# Patient Record
Sex: MALE | Race: WHITE | NOT HISPANIC OR LATINO | Employment: FULL TIME | URBAN - METROPOLITAN AREA
[De-identification: names, ages, dates, MRNs, and addresses within clinical notes are randomized per-mention and may not be internally consistent; named-entity substitution may affect disease eponyms.]

---

## 2017-02-12 ENCOUNTER — APPOINTMENT (OUTPATIENT)
Dept: GENERAL RADIOLOGY | Facility: HOSPITAL | Age: 47
End: 2017-02-12

## 2017-02-12 ENCOUNTER — APPOINTMENT (OUTPATIENT)
Dept: CARDIOLOGY | Facility: HOSPITAL | Age: 47
End: 2017-02-12
Attending: EMERGENCY MEDICINE

## 2017-02-12 ENCOUNTER — HOSPITAL ENCOUNTER (EMERGENCY)
Facility: HOSPITAL | Age: 47
Discharge: HOME | End: 2017-02-12
Attending: EMERGENCY MEDICINE | Admitting: EMERGENCY MEDICINE

## 2017-02-12 VITALS
DIASTOLIC BLOOD PRESSURE: 88 MMHG | WEIGHT: 230 LBS | SYSTOLIC BLOOD PRESSURE: 123 MMHG | HEART RATE: 68 BPM | OXYGEN SATURATION: 99 % | TEMPERATURE: 97.6 F | HEIGHT: 74 IN | BODY MASS INDEX: 29.52 KG/M2 | RESPIRATION RATE: 16 BRPM

## 2017-02-12 DIAGNOSIS — R00.2 HEART PALPITATIONS: Primary | ICD-10-CM

## 2017-02-12 DIAGNOSIS — G90.A POTS (POSTURAL ORTHOSTATIC TACHYCARDIA SYNDROME): ICD-10-CM

## 2017-02-12 DIAGNOSIS — R42 LIGHT HEADED: ICD-10-CM

## 2017-02-12 LAB
ALBUMIN SERPL-MCNC: 4.1 G/DL (ref 3.5–5.2)
ALBUMIN/GLOB SERPL: 1.5 G/DL
ALP SERPL-CCNC: 55 U/L (ref 39–117)
ALT SERPL W P-5'-P-CCNC: 23 U/L (ref 1–41)
ANION GAP SERPL CALCULATED.3IONS-SCNC: 15 MMOL/L
AST SERPL-CCNC: 22 U/L (ref 1–40)
BASOPHILS # BLD AUTO: 0.01 10*3/MM3 (ref 0–0.2)
BASOPHILS NFR BLD AUTO: 0.1 % (ref 0–1.5)
BILIRUB SERPL-MCNC: 0.3 MG/DL (ref 0.1–1.2)
BUN BLD-MCNC: 19 MG/DL (ref 6–20)
BUN/CREAT SERPL: 16.2 (ref 7–25)
CALCIUM SPEC-SCNC: 10.2 MG/DL (ref 8.6–10.5)
CHLORIDE SERPL-SCNC: 103 MMOL/L (ref 98–107)
CO2 SERPL-SCNC: 23 MMOL/L (ref 22–29)
CREAT BLD-MCNC: 1.17 MG/DL (ref 0.76–1.27)
DEPRECATED RDW RBC AUTO: 43.2 FL (ref 37–54)
EOSINOPHIL # BLD AUTO: 0.17 10*3/MM3 (ref 0–0.7)
EOSINOPHIL NFR BLD AUTO: 2 % (ref 0.3–6.2)
ERYTHROCYTE [DISTWIDTH] IN BLOOD BY AUTOMATED COUNT: 13.3 % (ref 11.5–14.5)
GFR SERPL CREATININE-BSD FRML MDRD: 67 ML/MIN/1.73
GLOBULIN UR ELPH-MCNC: 2.8 GM/DL
GLUCOSE BLD-MCNC: 111 MG/DL (ref 65–99)
HCT VFR BLD AUTO: 46.7 % (ref 40.4–52.2)
HGB BLD-MCNC: 15.5 G/DL (ref 13.7–17.6)
IMM GRANULOCYTES # BLD: 0 10*3/MM3 (ref 0–0.03)
IMM GRANULOCYTES NFR BLD: 0 % (ref 0–0.5)
LYMPHOCYTES # BLD AUTO: 2.2 10*3/MM3 (ref 0.9–4.8)
LYMPHOCYTES NFR BLD AUTO: 26.3 % (ref 19.6–45.3)
MAGNESIUM SERPL-MCNC: 2.2 MG/DL (ref 1.6–2.6)
MCH RBC QN AUTO: 29.5 PG (ref 27–32.7)
MCHC RBC AUTO-ENTMCNC: 33.2 G/DL (ref 32.6–36.4)
MCV RBC AUTO: 89 FL (ref 79.8–96.2)
MONOCYTES # BLD AUTO: 0.7 10*3/MM3 (ref 0.2–1.2)
MONOCYTES NFR BLD AUTO: 8.4 % (ref 5–12)
NEUTROPHILS # BLD AUTO: 5.28 10*3/MM3 (ref 1.9–8.1)
NEUTROPHILS NFR BLD AUTO: 63.2 % (ref 42.7–76)
PLATELET # BLD AUTO: 250 10*3/MM3 (ref 140–500)
PMV BLD AUTO: 10.8 FL (ref 6–12)
POTASSIUM BLD-SCNC: 4.1 MMOL/L (ref 3.5–5.2)
PROT SERPL-MCNC: 6.9 G/DL (ref 6–8.5)
RBC # BLD AUTO: 5.25 10*6/MM3 (ref 4.6–6)
SODIUM BLD-SCNC: 141 MMOL/L (ref 136–145)
T4 FREE SERPL-MCNC: 1.06 NG/DL (ref 0.93–1.7)
TROPONIN T SERPL-MCNC: <0.01 NG/ML (ref 0–0.03)
TSH SERPL DL<=0.05 MIU/L-ACNC: 1.37 MIU/ML (ref 0.27–4.2)
WBC NRBC COR # BLD: 8.36 10*3/MM3 (ref 4.5–10.7)

## 2017-02-12 PROCEDURE — 84439 ASSAY OF FREE THYROXINE: CPT | Performed by: EMERGENCY MEDICINE

## 2017-02-12 PROCEDURE — 93225 XTRNL ECG REC<48 HRS REC: CPT

## 2017-02-12 PROCEDURE — 96360 HYDRATION IV INFUSION INIT: CPT

## 2017-02-12 PROCEDURE — 83735 ASSAY OF MAGNESIUM: CPT | Performed by: EMERGENCY MEDICINE

## 2017-02-12 PROCEDURE — 84484 ASSAY OF TROPONIN QUANT: CPT | Performed by: EMERGENCY MEDICINE

## 2017-02-12 PROCEDURE — 99284 EMERGENCY DEPT VISIT MOD MDM: CPT

## 2017-02-12 PROCEDURE — 85025 COMPLETE CBC W/AUTO DIFF WBC: CPT | Performed by: EMERGENCY MEDICINE

## 2017-02-12 PROCEDURE — 84443 ASSAY THYROID STIM HORMONE: CPT | Performed by: EMERGENCY MEDICINE

## 2017-02-12 PROCEDURE — 93010 ELECTROCARDIOGRAM REPORT: CPT | Performed by: INTERNAL MEDICINE

## 2017-02-12 PROCEDURE — 93226 XTRNL ECG REC<48 HR SCAN A/R: CPT

## 2017-02-12 PROCEDURE — 71020 HC CHEST PA AND LATERAL: CPT

## 2017-02-12 PROCEDURE — 80053 COMPREHEN METABOLIC PANEL: CPT | Performed by: EMERGENCY MEDICINE

## 2017-02-12 PROCEDURE — 93005 ELECTROCARDIOGRAM TRACING: CPT

## 2017-02-12 RX ORDER — SODIUM CHLORIDE 0.9 % (FLUSH) 0.9 %
10 SYRINGE (ML) INJECTION AS NEEDED
Status: DISCONTINUED | OUTPATIENT
Start: 2017-02-12 | End: 2017-02-12 | Stop reason: HOSPADM

## 2017-02-12 RX ADMIN — SODIUM CHLORIDE 1000 ML: 9 INJECTION, SOLUTION INTRAVENOUS at 12:06

## 2017-02-12 NOTE — ED PROVIDER NOTES
EMERGENCY DEPARTMENT ENCOUNTER    CHIEF COMPLAINT  Chief Complaint: palpitations and chest pain  History given by: patient   History limited by: nothing   Room Number: 28/28  PMD: Jorge Blandon MD  Cardiologist: Dr. Melgar     HPI:  Pt is a 46 y.o. male who presents complaining of intermittent palpitations and chest pain for 2 days. Pt also complains of lightheadedness during palpitations, but he denies syncope and any other sx at this time. Nothing seems to exacerbate or alleviate the palpitations and chest pain. Pt consumes a small amount of caffeine each day and does not use recreational drugs. Pt has a personal h/o MVP and a strong family h/o early onset heart disease. Pt has worn a Holter monitor in the past, but nothing was found at that time.     Duration:  2 days, episodes vary in length  Timing: intermittent   Location: chest  Radiation: does not radiate   Quality: new  Intensity/Severity: moderate   Progression: improved at this time   Associated Symptoms: palpitations, chest pain, lightheadedness   Aggravating Factors: none specified   Alleviating Factors: none specified   Previous Episodes: none specified   Treatment before arrival: none specified     PAST MEDICAL HISTORY  Active Ambulatory Problems     Diagnosis Date Noted   • No Active Ambulatory Problems     Resolved Ambulatory Problems     Diagnosis Date Noted   • No Resolved Ambulatory Problems     Past Medical History   Diagnosis Date   • MVP (mitral valve prolapse)        PAST SURGICAL HISTORY  Past Surgical History   Procedure Laterality Date   • Adenoidectomy     • Tonsillectomy         FAMILY HISTORY  History reviewed. No pertinent family history.    SOCIAL HISTORY  Social History     Social History   • Marital status:      Spouse name: N/A   • Number of children: N/A   • Years of education: N/A     Occupational History   • Not on file.     Social History Main Topics   • Smoking status: Never Smoker   • Smokeless tobacco: Not on  file   • Alcohol use No   • Drug use: No   • Sexual activity: Not on file     Other Topics Concern   • Not on file     Social History Narrative   • No narrative on file       ALLERGIES  Review of patient's allergies indicates no known allergies.    REVIEW OF SYSTEMS  Review of Systems   Constitutional: Negative for activity change, appetite change and fever.   HENT: Negative for congestion and sore throat.    Eyes: Negative.    Respiratory: Negative for cough and shortness of breath.    Cardiovascular: Positive for chest pain and palpitations. Negative for leg swelling.   Gastrointestinal: Negative for abdominal pain, diarrhea and vomiting.   Endocrine: Negative.    Genitourinary: Negative for decreased urine volume and dysuria.   Musculoskeletal: Negative for neck pain.   Skin: Negative for rash and wound.   Allergic/Immunologic: Negative.    Neurological: Positive for light-headedness. Negative for weakness, numbness and headaches.   Hematological: Negative.    Psychiatric/Behavioral: Negative.    All other systems reviewed and are negative.      PHYSICAL EXAM  ED Triage Vitals   Temp Heart Rate Resp BP SpO2   02/12/17 1042 02/12/17 1042 02/12/17 1042 -- 02/12/17 1042   97.1 °F (36.2 °C) 169 20  99 %      Temp src Heart Rate Source Patient Position BP Location FiO2 (%)   02/12/17 1042 02/12/17 1042 -- -- --   Tympanic Monitor          Physical Exam   Constitutional: He is oriented to person, place, and time and well-developed, well-nourished, and in no distress.   HENT:   Head: Normocephalic and atraumatic.   Eyes: EOM are normal. Pupils are equal, round, and reactive to light.   Neck: Normal range of motion. Neck supple.   Cardiovascular: Normal rate, regular rhythm and normal heart sounds.    Pulmonary/Chest: Effort normal and breath sounds normal. No respiratory distress.   Abdominal: Soft. There is no tenderness. There is no rebound and no guarding.   Musculoskeletal: Normal range of motion. He exhibits no  edema.   Neurological: He is alert and oriented to person, place, and time. He has normal sensation and normal strength.   Skin: Skin is warm and dry.   Psychiatric: Mood and affect normal.   Nursing note and vitals reviewed.      LAB RESULTS  Lab Results (last 24 hours)     Procedure Component Value Units Date/Time    CBC & Differential [[id number]] Collected:  02/12/17 1104    Specimen:  Blood Updated:  02/12/17 1118    Narrative:       The following orders were created for panel order CBC & Differential.  Procedure                               Abnormality         Status                     ---------                               -----------         ------                     CBC Auto Differential[[id number]]         Normal              Final result                 Please view results for these tests on the individual orders.    Comprehensive Metabolic Panel [[id number]]  (Abnormal) Collected:  02/12/17 1104    Specimen:  Blood Updated:  02/12/17 1147     Glucose 111 (H) mg/dL      BUN 19 mg/dL      Creatinine 1.17 mg/dL      Sodium 141 mmol/L      Potassium 4.1 mmol/L       Specimen hemolyzed.  Results may be affected. Release stat results on hemolyzed specimen per Dr. Cuellar.        Chloride 103 mmol/L      CO2 23.0 mmol/L      Calcium 10.2 mg/dL      Total Protein 6.9 g/dL      Albumin 4.10 g/dL      ALT (SGPT) 23 U/L       Specimen hemolyzed.  Results may be affected.        AST (SGOT) 22 U/L       Specimen hemolyzed.  Results may be affected.        Alkaline Phosphatase 55 U/L      Total Bilirubin 0.3 mg/dL      eGFR Non African Amer 67 mL/min/1.73      Globulin 2.8 gm/dL      A/G Ratio 1.5 g/dL      BUN/Creatinine Ratio 16.2      Anion Gap 15.0 mmol/L     Troponin [[id number]]  (Normal) Collected:  02/12/17 1104    Specimen:  Blood Updated:  02/12/17 1155     Troponin T <0.010 ng/mL       Specimen hemolyzed, results may be affected.        Narrative:       Troponin T Reference Ranges:  Less than 0.03 ng/mL:     Negative for AMI  0.03 to 0.09 ng/mL:      Indeterminant for AMI  Greater than 0.09 ng/mL: Positive for AMI    T4, Free [[id number]]  (Normal) Collected:  02/12/17 1104    Specimen:  Blood Updated:  02/12/17 1142     Free T4 1.06 ng/dL     TSH [[id number]]  (Normal) Collected:  02/12/17 1104    Specimen:  Blood Updated:  02/12/17 1142     TSH 1.370 mIU/mL     Magnesium [[id number]]  (Normal) Collected:  02/12/17 1104    Specimen:  Blood Updated:  02/12/17 1136     Magnesium 2.2 mg/dL     CBC Auto Differential [[id number]]  (Normal) Collected:  02/12/17 1104    Specimen:  Blood Updated:  02/12/17 1118     WBC 8.36 10*3/mm3      RBC 5.25 10*6/mm3      Hemoglobin 15.5 g/dL      Hematocrit 46.7 %      MCV 89.0 fL      MCH 29.5 pg      MCHC 33.2 g/dL      RDW 13.3 %      RDW-SD 43.2 fl      MPV 10.8 fL      Platelets 250 10*3/mm3      Neutrophil % 63.2 %      Lymphocyte % 26.3 %      Monocyte % 8.4 %      Eosinophil % 2.0 %      Basophil % 0.1 %      Immature Grans % 0.0 %      Neutrophils, Absolute 5.28 10*3/mm3      Lymphocytes, Absolute 2.20 10*3/mm3      Monocytes, Absolute 0.70 10*3/mm3      Eosinophils, Absolute 0.17 10*3/mm3      Basophils, Absolute 0.01 10*3/mm3      Immature Grans, Absolute 0.00 10*3/mm3           I ordered the above labs and reviewed the results    RADIOLOGY  XR Chest 2 View   Final Result      CXR is negative.     I ordered the above noted radiological studies. Interpreted by radiologist. Reviewed by me in PACS.       PROCEDURES  Procedures  EKG           EKG time: 1045  Rhythm/Rate: Sinus tachycardia at 125  P waves and DC: normal  QRS, axis: poor R wave progression anteriorly    ST and T waves: nonspecific ST changes      Interpreted Contemporaneously by me, independently viewed  Changed compared to prior 10/01/14      PROGRESS AND CONSULTS  ED Course     1043: Ordered EKG for further evaluation.     1101: Ordered CXR and labs for further evaluation.     1153: Orthostatics reveal that pt's HR  increases upon standing, so I will order fluids.     1157: Rechecked pt. Pt is resting comfortably. Discussed unremarkable workup and plan to consult cardiology.   BP: 131/93 HR: 111 Temp: 97.1 °F (36.2 °C) (Tympanic) O2 sat: 96%    1217: Discussed pt's case with Dr. Linder (cardiology), who recommends either placing a ZIO patch or 48 hour Holter; however, there has been some difficulty getting ZIO patches approved by insurance companies recently. Pt will be seen in f/u.     1304: Rechecked pt. Pt is resting comfortably. Discussed plan for discharge with Holter monitor and cardiac f/u to rule out POTS. I provided pt with POTS reading material. Pt understands and agrees with plan and all questions were addressed.   BP: 121/88 HR: 67 Temp: 97.1 °F (36.2 °C) (Tympanic) O2 sat: 96%    MEDICAL DECISION MAKING  Results were reviewed/discussed with the patient and they were also made aware of online access. Pt also made aware that some labs, such as cultures, will not be resulted during ER visit and follow up with PMD is necessary.     MDM  Number of Diagnoses or Management Options     Amount and/or Complexity of Data Reviewed  Decide to obtain previous medical records or to obtain history from someone other than the patient: yes  Review and summarize past medical records: yes (Pt had an echo done in October 2014 that showed EF of 68%, trace mitral regurg, trace pv regurg, and otherwise nl. )         DIAGNOSIS  Final diagnoses:   Heart palpitations   Light headed   Rule Out POTS (postural orthostatic tachycardia syndrome)-       DISPOSITION  DISCHARGE    Patient discharged in stable condition.    Reviewed implications of results, diagnosis, meds, responsibility to follow up, warning signs and symptoms of possible worsening, potential complications and reasons to return to ER.    Patient/Family voiced understanding of above instructions.    Discussed plan for discharge, as there is no emergent indication for admission.   Pt/family is agreeable and understands need for follow up and repeat testing.  Pt is aware that discharge does not mean that nothing is wrong but it indicates no emergency is present that requires admission and they must continue care with follow-up as given below or physician of their choice.     FOLLOW-UP  Aaron Melgar MD  [address]  [place] [id number] [phone]      for continued evaluation of symptoms and Holter results         Medication List      Notice     No changes were made to your prescriptions during this visit.        Latest Documented Vital Signs:  As of 3:22 PM  BP- 123/88 HR- 68 Temp- 97.6 °F (36.4 °C) O2 sat- 99%    --  Documentation assistance provided by bud Tatum for Jeet Cuellar.  Information recorded by the scribe was done at my direction and has been verified and validated by me.               Rosina Tatum  02/12/17 0133       Jeet Cuellar MD  02/12/17 0734

## 2017-02-15 ENCOUNTER — TELEPHONE (OUTPATIENT)
Dept: SOCIAL WORK | Facility: HOSPITAL | Age: 47
End: 2017-02-15

## 2017-02-16 PROCEDURE — 93227 XTRNL ECG REC<48 HR R&I: CPT | Performed by: INTERNAL MEDICINE

## 2017-03-13 ENCOUNTER — OFFICE VISIT (OUTPATIENT)
Dept: CARDIOLOGY | Facility: CLINIC | Age: 47
End: 2017-03-13

## 2017-03-13 VITALS
DIASTOLIC BLOOD PRESSURE: 100 MMHG | BODY MASS INDEX: 30.29 KG/M2 | WEIGHT: 236 LBS | HEART RATE: 58 BPM | HEIGHT: 74 IN | SYSTOLIC BLOOD PRESSURE: 140 MMHG

## 2017-03-13 DIAGNOSIS — I10 ESSENTIAL HYPERTENSION: ICD-10-CM

## 2017-03-13 DIAGNOSIS — R00.2 PALPITATIONS: Primary | ICD-10-CM

## 2017-03-13 DIAGNOSIS — R06.02 SHORTNESS OF BREATH: ICD-10-CM

## 2017-03-13 PROCEDURE — 99204 OFFICE O/P NEW MOD 45 MIN: CPT | Performed by: INTERNAL MEDICINE

## 2017-03-13 PROCEDURE — 93000 ELECTROCARDIOGRAM COMPLETE: CPT | Performed by: INTERNAL MEDICINE

## 2017-03-13 NOTE — PROGRESS NOTES
Date of Office Visit: 17  Encounter Provider: Aaron Melgar MD  Place of Service: [facility] CARDIOLOGY  Patient Name: Adan Chauhan  :1970      Chief Complaint   Patient presents with   • Rapid Heart Rate     History of Present Illness  HPI Comments: Mr Chauhan is a pleasant 45 yo gentleman has a history of oral and hypertension, questionable mitral valve prolapse, and history of palpitations.  We saw him back in  with atypical symptoms at that time apparently with sinus mitral prolapse however follow-up echocardiogram in  did not confirm that.  But he now comes in for evaluation of palpitations.  As well as some shortness of breath.  He states that  he was having some rapid heart beat palpitations that was also then occurred on Saturday and then suddenly got worse and over to brush his 4-year-olds teeth before they went to [place] that he became dizzy lightheaded when to the emergency room.  There he ruled out.  He had a 48 day event monitor that was negative.  He probably has had these in the past but they've gotten more frequent and this episode was more severe.  He does have intermittent hypertension they say that it's related to stress.  He denies any near-syncope or syncope.  He does get shortness of breath especially if he mows the yard.  Denies orthopnea or PND.  Denies any lower extremity edema.  He does not do any regular exercise.  Denies any history of rheumatic fever, heart attack, heart failure.  About 2 years ago he had an episode where he had the visual disturbance in his right eye thought the modified without a stroke but that workup was negative.  He's also noted that when he gets these episodes of rapid heart beating that he coughs which does tend to help it's kind of a reflex though she also is noted that squatting down may help.        Past Medical History   Diagnosis Date   • Chest pain    • Emphysema lung       mild   • Lightheadedness    • MVP (mitral valve prolapse)    • Palpitations    • Sinus tachycardia    • Visual disturbances          Past Surgical History   Procedure Laterality Date   • Adenoidectomy     • Tonsillectomy     • Hemorroidectomy     • Carpal tunnel release           Current Outpatient Prescriptions on File Prior to Visit   Medication Sig Dispense Refill   • [DISCONTINUED] aspirin 81 MG tablet Take 81 mg by mouth.     • [DISCONTINUED] ibuprofen (ADVIL,MOTRIN) 200 MG tablet Take 200 mg by mouth Every 6 (Six) Hours.     • [DISCONTINUED] MULTIPLE VITAMINS-MINERALS ER PO Take 1 tablet by mouth.     • [DISCONTINUED] naproxen (NAPROSYN) 500 MG tablet Take 500 mg by mouth.       No current facility-administered medications on file prior to visit.          Social History     Social History   • Marital status:      Spouse name: N/A   • Number of children: N/A   • Years of education: N/A     Occupational History   • Not on file.     Social History Main Topics   • Smoking status: Former Smoker     Types: Cigarettes     Quit date: 2017   • Smokeless tobacco: Not on file   • Alcohol use No   • Drug use: No   • Sexual activity: Not on file     Other Topics Concern   • Not on file     Social History Narrative       Family History   Problem Relation Age of Onset   • Stroke Father          Review of Systems   Constitution: Negative for decreased appetite, diaphoresis, fever, weakness, malaise/fatigue, weight gain and weight loss.   HENT: Negative for congestion, headaches, hearing loss, nosebleeds and tinnitus.    Eyes: Negative for blurred vision, double vision, vision loss in left eye, vision loss in right eye and visual disturbance.   Cardiovascular:        As noted in HPI   Respiratory:        As noted HPI   Endocrine: Negative for cold intolerance and heat intolerance.   Hematologic/Lymphatic: Negative for bleeding problem. Does not bruise/bleed easily.   Skin: Negative for color change, flushing, itching  "and rash.   Musculoskeletal: Negative for arthritis, back pain, joint pain, joint swelling, muscle weakness and myalgias.   Gastrointestinal: Negative for bloating, abdominal pain, constipation, diarrhea, dysphagia, heartburn, hematemesis, hematochezia, melena, nausea and vomiting.   Genitourinary: Negative for bladder incontinence, dysuria, frequency, nocturia and urgency.   Neurological: Negative for dizziness, focal weakness, light-headedness, loss of balance, numbness, paresthesias and vertigo.   Psychiatric/Behavioral: Negative for depression, memory loss and substance abuse.       Procedures      ECG 12 Lead  Date/Time: 3/13/2017 10:15 AM  Performed by: NATHALIE GARCIA  Authorized by: NATHALIE GARCIA   Comparison: compared with previous ECG   Similar to previous ECG  Rhythm: sinus rhythm  Rate: normal  QRS axis: normal                 Objective:      Visit Vitals   • /100 (BP Location: Left arm)   • Pulse 58   • Ht 74\" (188 cm)   • Wt 236 lb (107 kg)   • BMI 30.3 kg/m2          Physical Exam  Physical Exam   Constitutional: He is oriented to person, place, and time. He appears well-developed and well-nourished. No distress.   HENT:   Head: Normocephalic.   Eyes: Conjunctivae are normal. Pupils are equal, round, and reactive to light. No scleral icterus.   Neck: Normal carotid pulses, no hepatojugular reflux and no JVD present. Carotid bruit is not present. No tracheal deviation, no edema and no erythema present. No thyromegaly present.   Cardiovascular: Normal rate, regular rhythm, S1 normal, S2 normal, normal heart sounds and intact distal pulses.   No extrasystoles are present. PMI is not displaced.  Exam reveals no gallop, no distant heart sounds and no friction rub.    No murmur heard.  Pulses:       Carotid pulses are 2+ on the right side, and 2+ on the left side.       Radial pulses are 2+ on the right side, and 2+ on the left side.        Femoral pulses are 2+ on the right side, and 2+ on the " left side.       Dorsalis pedis pulses are 2+ on the right side, and 2+ on the left side.        Posterior tibial pulses are 2+ on the right side, and 2+ on the left side.   Pulmonary/Chest: Effort normal and breath sounds normal. No respiratory distress. He has no decreased breath sounds. He has no wheezes. He has no rhonchi. He has no rales. He exhibits no tenderness.   Abdominal: Soft. Bowel sounds are normal. He exhibits no distension and no mass. There is no hepatosplenomegaly. There is no tenderness. There is no rebound and no guarding.   Musculoskeletal: He exhibits no edema, tenderness or deformity.   Neurological: He is alert and oriented to person, place, and time.   Skin: Skin is warm and dry. No rash noted. He is not diaphoretic. No cyanosis or erythema. No pallor. Nails show no clubbing.   Psychiatric: He has a normal mood and affect. His speech is normal and behavior is normal. Judgment and thought content normal.           Assessment:   1. 45 yo gentle man with palpitations that sound compatible with supraventricular tachycardia. He has them so infrequently I doubt we would pick them up.  However they could be related to his high blood pressure.  2.  Hypertension.  Concerned that it may be more continuous now.  They're to check his blood pressures twice a day at home and call us back in a week if elevated treating him with beta blocker or calcium channel blocker may treat both the supraventricular arrhythmia as well as his hypertension.  3.  Dyspnea with activity.  He's return for a stress test.  He could have exercise-induced arrhythmia.  Could have exercise-induced hypertension.  Also we'll rule out ischemic disease.  4.  Questionable mitral prolapse not confirmed on echocardiogram 2-1/2 years ago. And I do not hear a murmur today.         Plan:

## 2017-03-22 ENCOUNTER — TELEPHONE (OUTPATIENT)
Dept: CARDIOLOGY | Facility: CLINIC | Age: 47
End: 2017-03-22

## 2017-03-22 ENCOUNTER — HOSPITAL ENCOUNTER (OUTPATIENT)
Dept: CARDIOLOGY | Facility: HOSPITAL | Age: 47
Discharge: HOME | End: 2017-03-22
Attending: INTERNAL MEDICINE | Admitting: INTERNAL MEDICINE

## 2017-03-22 DIAGNOSIS — R00.2 PALPITATIONS: ICD-10-CM

## 2017-03-22 DIAGNOSIS — R06.02 SHORTNESS OF BREATH: ICD-10-CM

## 2017-03-22 LAB
BH CV STRESS BP STAGE 1: NORMAL
BH CV STRESS BP STAGE 2: NORMAL
BH CV STRESS BP STAGE 3: NORMAL
BH CV STRESS DURATION MIN STAGE 1: 3
BH CV STRESS DURATION MIN STAGE 2: 3
BH CV STRESS DURATION MIN STAGE 3: 3
BH CV STRESS DURATION MIN STAGE 4: 1
BH CV STRESS DURATION SEC STAGE 1: 0
BH CV STRESS DURATION SEC STAGE 2: 0
BH CV STRESS DURATION SEC STAGE 3: 0
BH CV STRESS DURATION SEC STAGE 4: 0
BH CV STRESS GRADE STAGE 1: 10
BH CV STRESS GRADE STAGE 2: 12
BH CV STRESS GRADE STAGE 3: 14
BH CV STRESS GRADE STAGE 4: 16
BH CV STRESS HR STAGE 1: 118
BH CV STRESS HR STAGE 2: 164
BH CV STRESS HR STAGE 3: 192
BH CV STRESS HR STAGE 4: 198
BH CV STRESS METS STAGE 1: 5
BH CV STRESS METS STAGE 2: 7.5
BH CV STRESS METS STAGE 3: 10
BH CV STRESS METS STAGE 4: 13.5
BH CV STRESS PROTOCOL 1: NORMAL
BH CV STRESS RECOVERY BP: NORMAL MMHG
BH CV STRESS RECOVERY HR: 124 BPM
BH CV STRESS SPEED STAGE 1: 1.7
BH CV STRESS SPEED STAGE 2: 2.5
BH CV STRESS SPEED STAGE 3: 3.4
BH CV STRESS SPEED STAGE 4: 4.2
BH CV STRESS STAGE 1: 1
BH CV STRESS STAGE 2: 2
BH CV STRESS STAGE 3: 3
BH CV STRESS STAGE 4: 4
MAXIMAL PREDICTED HEART RATE: 175 BPM
PERCENT MAX PREDICTED HR: 113.14 %
STRESS BASELINE BP: NORMAL MMHG
STRESS BASELINE HR: 77 BPM
STRESS PERCENT HR: 133 %
STRESS POST ESTIMATED WORKLOAD: 11 METS
STRESS POST EXERCISE DUR MIN: 10 MIN
STRESS POST EXERCISE DUR SEC: 0 SEC
STRESS POST PEAK BP: NORMAL MMHG
STRESS POST PEAK HR: 198 BPM
STRESS TARGET HR: 149 BPM

## 2017-03-22 PROCEDURE — 93017 CV STRESS TEST TRACING ONLY: CPT

## 2017-03-22 PROCEDURE — 93018 CV STRESS TEST I&R ONLY: CPT | Performed by: INTERNAL MEDICINE

## 2017-03-22 PROCEDURE — 93016 CV STRESS TEST SUPVJ ONLY: CPT | Performed by: INTERNAL MEDICINE

## 2017-03-22 NOTE — TELEPHONE ENCOUNTER
Adan Chauhan  Male, 46 y.o., 1970  PCP:   Jorge Blandon MD  Language:   English  Need Interp:   No  Last Weight:   236 lb (107 kg)  Phone:   W: [phone] H: [phone]  Allergies  No Known Allergies  Health Maintenance:   Due  FYI:   None  Primary Ins.:   [employer]  MRN:   [id number]  MyChart:   Pending  Pharmacy:   [id number] - [phone] [facility] [phone] FX [[id number]]  Preferred Lab:   None  Next Appt with Me:   None  Next Appt Date by Dept:   None    Treadmill Stress Test   Status:  Final result   Visible to patient:  No (Not Released)   Dx:  Palpitations; Shortness of breath Order: [id number]         Details        Reading Physician Reading Date Result Priority       MD Krzysztof Villa MD 3/22/2017  3/22/2017 Routine           Result Text             · No definative ECG evidence of myocardial ischemia  · Pt had 1mm up-sloping ST during exercise which resolved within 1 min into recovery.                    All Measurements          Ref Range & Units 9:26 AM         BH CV STRESS PROTOCOL 1  Hussein       Stage 1  1       HR Stage 1  118       BP Stage 1  152/96       Duration Min Stage 1  3       Duration Sec Stage 1  0       Grade Stage 1  10       Speed Stage 1  1.7       BH CV STRESS METS STAGE 1  5       Stage 2  2       HR Stage 2  164       BP Stage 2  158/100       Duration Min Stage 2  3       Duration Sec Stage 2  0       Grade Stage 2  12       Speed Stage 2  2.5       BH CV STRESS METS STAGE 2  7.5       Stage 3  3       HR Stage 3  192       BP Stage 3  164/100       Duration Min Stage 3  3       Duration Sec Stage 3  0       Grade Stage 3  14       Speed Stage 3  3.4       BH CV STRESS METS STAGE 3  10.0       Stage 4  4       HR Stage 4  198       Duration Min Stage 4  1       Duration Sec Stage 4  0       Grade Stage 4  16       Speed Stage 4  4.2       BH CV STRESS METS STAGE 4  13.5       Baseline HR bpm 77        Baseline BP mmHg 130/96       Peak HR bpm 198       Percent Max Pred HR % 113.14       Percent Target HR % 133       Peak BP mmHg 164/100       Recovery HR bpm 124       Recovery BP mmHg 158/92       Target HR (85%) bpm 149       Max. Pred. HR (100%) bpm 175       Exercise duration (min) min 10       Exercise duration (sec) sec 0       Estimated workload METS 11.0                       [facility]  [address]  [address]  [facility] [address] [phone]            Stress Data        Stage HR (bpm) BP (mmHg) Minutes Seconds Grade (%) Speed (MPH)       1        118        152/96        3        0        10        1.7              2        164        158/100        3        0        12        2.5              3        192        164/100        3        0        14        3.4              4        198            1        0        16        4.2                  Stress Measurements        Baseline Vitals   Baseline HR 77 bpm        Baseline /96 mmHg         Peak Stress Vitals   Peak  bpm        Peak /100 mmHg         Recovery Vitals   Recovery  bpm        Recovery /92 mmHg         Exercise Data   Target HR (85%) 149 bpm        Max. Pred. HR (100%) 175 bpm        Percent Max Pred .14 %        Exercise duration (min) 10 min        Exercise duration (sec) 0 sec        Estimated workload 11 METS                 Stress Procedure        Rest ECG  Baseline ECG of normal sinus rhythm noted. Normal baseline ECG noted. Slight ST elevation.   UT interval = 160 ms. QRS complex = 80 ms. There was no ST segment deviation noted.       Stress ECG  Stress ECG of normal sinus rhythm noted. Normal ECG with no significant stress induced changes noted.   There was no ST segment deviation noted during stress.   Arrhythmias during stress: occasional PVCs.   There were no arrhythmias during recovery.   Arrhythmias were not significant.   ECG was interpretable and indicates  a normal stress ECG.   Normal ECG stress ECG interpretation.       Stress Description  A stress test was performed following the Hussein protocol.   The patient achieved the target heart rate.   The patient reported no symptoms during the stress test. The patient experienced no angina during the stress test.   The Duke Treadmill Score of 10 is consistent with a low risk for ischemic heart disease.   Blood pressure demonstrated a hypertensive response to exercise. Heart rate demonstrated a normal response to exercise. Overall, the patient's exercise capacity was normal.       Stress Findings  No ECG evidence of myocardial ischemia.Negative clinical evidence of myocardial ischemia. Findings consistent with a normal ECG stress test. Pt had 1mm up-sloping ST during exercise which resolved within 1 min into recovery..                   Last Resulted: 03/22/17  1:37 PM                            Routing History        Priority Sent On From To Message Type        3/22/2017  1:39 PM MD Jorge Villa MD Results        3/22/2017  1:39 PM MD Aaron Villa MD Results            Reviewed By Anjali Ruelas MD on 3/22/2017  2:21 PM

## 2017-03-24 RX ORDER — AMLODIPINE BESYLATE 2.5 MG/1
2.5 TABLET ORAL DAILY
Qty: 30 TABLET | Refills: 11 | Status: SHIPPED | OUTPATIENT
Start: 2017-03-24 | End: 2018-03-13 | Stop reason: SDUPTHER

## 2017-09-06 ENCOUNTER — TELEPHONE (OUTPATIENT)
Dept: CARDIOLOGY | Facility: CLINIC | Age: 47
End: 2017-09-06

## 2017-09-06 NOTE — TELEPHONE ENCOUNTER
Pt's wife Lizzy called to report pt is experiencing diarrhea >5 bowel movements a day for the past several months. He was started on Norvasc 2.5mg daily on 3/24/17 ,this is listed as a possible side effect. Please advise [phone]

## 2018-03-13 RX ORDER — AMLODIPINE BESYLATE 2.5 MG/1
TABLET ORAL
Qty: 30 TABLET | Refills: 2 | Status: SHIPPED | OUTPATIENT
Start: 2018-03-13 | End: 2018-05-11 | Stop reason: SDUPTHER

## 2018-05-15 RX ORDER — AMLODIPINE BESYLATE 2.5 MG/1
TABLET ORAL
Qty: 30 TABLET | Refills: 1 | Status: SHIPPED | OUTPATIENT
Start: 2018-05-15 | End: 2018-06-22 | Stop reason: SDUPTHER

## 2018-06-22 ENCOUNTER — TELEPHONE (OUTPATIENT)
Dept: CARDIOLOGY | Facility: CLINIC | Age: 48
End: 2018-06-22

## 2018-06-22 RX ORDER — AMLODIPINE BESYLATE 2.5 MG/1
2.5 TABLET ORAL DAILY
Qty: 30 TABLET | Refills: 1 | Status: SHIPPED | OUTPATIENT
Start: 2018-06-22 | End: 2018-08-13 | Stop reason: SDUPTHER

## 2018-06-22 NOTE — TELEPHONE ENCOUNTER
Lucy, patient called to ask for a refill on Norvasc.  There is documentation on 9/6/17 that it caused patient to have diarrhea and Dr. Melgar advised to stop taking it.  Mirna Phillip refilled this on 5/11/18.  I do not see where Dr. Melgar instructed to start back on this.  Can you please review chart to see if I have missed something.  Abelardo you/ OTONIEL     Patient can be reached at [phone] or [phone].

## 2018-06-22 NOTE — TELEPHONE ENCOUNTER
I s/w his wife Lizzy, who is an RN. The patient stopped drinking wine and his diarrhea resolved. His b/p remained high, and he resumed his Norvasc. I've scheduled him a yearly f/u in August @ [facility]   36.1

## 2018-08-13 ENCOUNTER — OFFICE VISIT (OUTPATIENT)
Dept: CARDIOLOGY | Facility: CLINIC | Age: 48
End: 2018-08-13

## 2018-08-13 ENCOUNTER — TELEPHONE (OUTPATIENT)
Dept: CARDIOLOGY | Facility: CLINIC | Age: 48
End: 2018-08-13

## 2018-08-13 ENCOUNTER — APPOINTMENT (OUTPATIENT)
Dept: LAB | Facility: HOSPITAL | Age: 48
End: 2018-08-13

## 2018-08-13 VITALS
HEIGHT: 74 IN | HEART RATE: 61 BPM | DIASTOLIC BLOOD PRESSURE: 90 MMHG | SYSTOLIC BLOOD PRESSURE: 140 MMHG | WEIGHT: 241 LBS | BODY MASS INDEX: 30.93 KG/M2

## 2018-08-13 DIAGNOSIS — E78.2 MIXED HYPERLIPIDEMIA: ICD-10-CM

## 2018-08-13 DIAGNOSIS — I10 ESSENTIAL HYPERTENSION: Primary | ICD-10-CM

## 2018-08-13 LAB
ALBUMIN SERPL-MCNC: 4.3 G/DL (ref 3.5–5.2)
ALBUMIN/GLOB SERPL: 1.5 G/DL
ALP SERPL-CCNC: 71 U/L (ref 39–117)
ALT SERPL W P-5'-P-CCNC: 20 U/L (ref 1–41)
ANION GAP SERPL CALCULATED.3IONS-SCNC: 10.6 MMOL/L
AST SERPL-CCNC: 20 U/L (ref 1–40)
BILIRUB SERPL-MCNC: 0.3 MG/DL (ref 0.1–1.2)
BUN BLD-MCNC: 18 MG/DL (ref 6–20)
BUN/CREAT SERPL: 16.4 (ref 7–25)
CALCIUM SPEC-SCNC: 10.7 MG/DL (ref 8.6–10.5)
CHLORIDE SERPL-SCNC: 105 MMOL/L (ref 98–107)
CHOLEST SERPL-MCNC: 176 MG/DL (ref 0–200)
CO2 SERPL-SCNC: 24.4 MMOL/L (ref 22–29)
CREAT BLD-MCNC: 1.1 MG/DL (ref 0.76–1.27)
GFR SERPL CREATININE-BSD FRML MDRD: 72 ML/MIN/1.73
GLOBULIN UR ELPH-MCNC: 2.9 GM/DL
GLUCOSE BLD-MCNC: 95 MG/DL (ref 65–99)
HDLC SERPL-MCNC: 47 MG/DL (ref 40–60)
LDLC SERPL CALC-MCNC: 103 MG/DL (ref 0–100)
LDLC/HDLC SERPL: 2.18 {RATIO}
POTASSIUM BLD-SCNC: 4.1 MMOL/L (ref 3.5–5.2)
PROT SERPL-MCNC: 7.2 G/DL (ref 6–8.5)
SODIUM BLD-SCNC: 140 MMOL/L (ref 136–145)
TRIGL SERPL-MCNC: 132 MG/DL (ref 0–150)
VLDLC SERPL-MCNC: 26.4 MG/DL (ref 5–40)

## 2018-08-13 PROCEDURE — 36415 COLL VENOUS BLD VENIPUNCTURE: CPT | Performed by: INTERNAL MEDICINE

## 2018-08-13 PROCEDURE — 80053 COMPREHEN METABOLIC PANEL: CPT | Performed by: INTERNAL MEDICINE

## 2018-08-13 PROCEDURE — 93000 ELECTROCARDIOGRAM COMPLETE: CPT | Performed by: INTERNAL MEDICINE

## 2018-08-13 PROCEDURE — 99214 OFFICE O/P EST MOD 30 MIN: CPT | Performed by: INTERNAL MEDICINE

## 2018-08-13 PROCEDURE — 80061 LIPID PANEL: CPT | Performed by: INTERNAL MEDICINE

## 2018-08-13 RX ORDER — AMLODIPINE BESYLATE 5 MG/1
5 TABLET ORAL DAILY
Qty: 30 TABLET | Refills: 11 | Status: SHIPPED | OUTPATIENT
Start: 2018-08-13 | End: 2018-08-29 | Stop reason: DRUGHIGH

## 2018-08-13 RX ORDER — CETIRIZINE HYDROCHLORIDE 10 MG/1
10 TABLET ORAL DAILY
COMMUNITY
End: 2020-10-19 | Stop reason: SDUPTHER

## 2018-08-13 NOTE — PROGRESS NOTES
Date of Office Visit: 18  Encounter Provider: Aaron Melgar MD  Place of Service: [facility] CARDIOLOGY  Patient Name: Adan Chauhan  :1970  Referral Provider:Jorge Blandon MD      Chief Complaint   Patient presents with   • Hypertension     History of Present Illness  Mr Chauhan is a pleasant 46yo gentleman has a history of oral and hypertension, questionable mitral valve prolapse, and history of palpitations.  We saw him back in  with atypical symptoms at that time apparently with mitral prolapse however follow-up echocardiogram in  did not confirm that.  In 2017 he was having some palpitations but too infrequent to .  Also having some shortness of breath had a normal ECG stress test.  He now comes in for follow-up.  He is been exercising runs 15 minutes and then lifts weights 4 days week without problems.  He brought in a number blood pressure recordings unfortunately been a little high size 150 systolic and 95 diastolic.  But he denies chest pain or pressure.  Denies any real shortness of breath, orthopnea, or PND.  No significant palpitations, near-syncope or syncope.  Overall he feels like he's doing reasonably well.          Past Medical History:   Diagnosis Date   • Chest pain    • Emphysema lung (CMS/HCC)     mild   • Lightheadedness    • MVP (mitral valve prolapse)    • Palpitations    • Sinus tachycardia    • Visual disturbances          Past Surgical History:   Procedure Laterality Date   • ADENOIDECTOMY     • CARPAL TUNNEL RELEASE     • HEMORROIDECTOMY     • TONSILLECTOMY           Current Outpatient Prescriptions on File Prior to Visit   Medication Sig Dispense Refill   • [DISCONTINUED] amLODIPine (NORVASC) 2.5 MG tablet Take 1 tablet by mouth Daily. 30 tablet 1     No current facility-administered medications on file prior to visit.          Social History     Social History   • Marital status:      Spouse name: Lizzy   •  Number of children: 3   • Years of education: N/A     Occupational History   • Not on file.     Social History Main Topics   • Smoking status: Former Smoker     Types: Cigarettes     Quit date: 2017   • Smokeless tobacco: Never Used   • Alcohol use 4.2 oz/week     7 Glasses of wine per week      Comment: 1 g/o red wine daily w/dinner   • Drug use: No   • Sexual activity: Defer     Other Topics Concern   • Not on file     Social History Narrative   • No narrative on file       Family History   Problem Relation Age of Onset   • Stroke Father    • Stroke Paternal Uncle          Review of Systems   Constitution: Negative for decreased appetite, diaphoresis, fever, weakness, malaise/fatigue, weight gain and weight loss.   HENT: Negative for congestion, hearing loss, nosebleeds and tinnitus.    Eyes: Negative for blurred vision, double vision, vision loss in left eye, vision loss in right eye and visual disturbance.   Cardiovascular:        As noted in HPI   Respiratory:        As noted HPI   Endocrine: Negative for cold intolerance and heat intolerance.   Hematologic/Lymphatic: Negative for bleeding problem. Does not bruise/bleed easily.   Skin: Negative for color change, flushing, itching and rash.   Musculoskeletal: Negative for arthritis, back pain, joint pain, joint swelling, muscle weakness and myalgias.   Gastrointestinal: Negative for bloating, abdominal pain, constipation, diarrhea, dysphagia, heartburn, hematemesis, hematochezia, melena, nausea and vomiting.   Genitourinary: Negative for bladder incontinence, dysuria, frequency, nocturia and urgency.   Neurological: Negative for dizziness, focal weakness, headaches, light-headedness, loss of balance, numbness, paresthesias and vertigo.   Psychiatric/Behavioral: Negative for depression, memory loss and substance abuse.       Procedures      ECG 12 Lead  Date/Time: 8/13/2018 11:30 AM  Performed by: NATHALIE GARCIA  Authorized by: NATHALIE GARCIA   Comparison:  "compared with previous ECG   Similar to previous ECG  Rhythm: sinus rhythm  Rate: normal  QRS axis: normal                  Objective:    /90 (BP Location: Left arm)   Pulse 61   Ht 188 cm (74\")   Wt 109 kg (241 lb)   BMI 30.94 kg/m²        Physical Exam  Physical Exam   Constitutional: He is oriented to person, place, and time. He appears well-developed and well-nourished. No distress.   HENT:   Head: Normocephalic.   Eyes: Pupils are equal, round, and reactive to light. Conjunctivae are normal. No scleral icterus.   Neck: Normal carotid pulses, no hepatojugular reflux and no JVD present. Carotid bruit is not present. No tracheal deviation, no edema and no erythema present. No thyromegaly present.   Cardiovascular: Normal rate, regular rhythm, S1 normal, S2 normal, normal heart sounds and intact distal pulses.   No extrasystoles are present. PMI is not displaced.  Exam reveals no gallop, no distant heart sounds and no friction rub.    No murmur heard.  Pulses:       Carotid pulses are 2+ on the right side, and 2+ on the left side.       Radial pulses are 2+ on the right side, and 2+ on the left side.        Femoral pulses are 2+ on the right side, and 2+ on the left side.       Dorsalis pedis pulses are 2+ on the right side, and 2+ on the left side.        Posterior tibial pulses are 2+ on the right side, and 2+ on the left side.   Pulmonary/Chest: Effort normal and breath sounds normal. No respiratory distress. He has no decreased breath sounds. He has no wheezes. He has no rhonchi. He has no rales. He exhibits no tenderness.   Abdominal: Soft. Bowel sounds are normal. He exhibits no distension and no mass. There is no hepatosplenomegaly. There is no tenderness. There is no rebound and no guarding.   Musculoskeletal: He exhibits no edema, tenderness or deformity.   Neurological: He is alert and oriented to person, place, and time.   Skin: Skin is warm and dry. No rash noted. He is not diaphoretic. No " cyanosis or erythema. No pallor. Nails show no clubbing.   Psychiatric: He has a normal mood and affect. His speech is normal and behavior is normal. Judgment and thought content normal.           Assessment:   1. 45 yo gentleman with history of palpitations that sound compatible with supraventricular tachycardia. No real recurrence.  2.  Hypertension.  Blood pressure still little elevated quite at goal he's going to further try to decrease his sodium intake.  We are increasing the amlodipine to 5 mg a day he's to call us back in one to 2 weeks let us know what those readings are.  3.  Questionable mitral prolapse not confirmed on echocardiogram 2-1/2 years ago. And I do not hear a murmur today.   4.  Cardiovascular risk assessment.  She's not had lipids checked in a while her in to check lipid profile on him today to make recommendations pending         Plan:

## 2018-08-13 NOTE — TELEPHONE ENCOUNTER
Adan Chauhan  Male, 47 y.o., 1970  PCP:   Jorge Blandon MD  Language:   English  Need Interp:   No  Last Weight:   109 kg (241 lb)  Phone:   W: [phone] M: [phone] H: [phone]  Allergies  No Known Allergies  Health Maintenance:   Due  FYI:   None  Primary Ins.:   [employer]  MRN:   [id number]  MyChart:   Active  Pharmacy:   [id number] - [phone] [facility] [phone] FX [[id number]]  Preferred Lab:   None  Next Appt with Me:   08/19/2019  Next Appt Date by Dept:   08/19/2019        Lipid Panel   Order: [id number]   Status:  Final result   Visible to patient:  No (Not Released) Dx:  Mixed hyperlipidemia    Ref Range & Units 12:03   Total Cholesterol 0 - 200 mg/dL 176    Triglycerides 0 - 150 mg/dL 132    HDL Cholesterol 40 - 60 mg/dL 47    LDL Cholesterol  0 - 100 mg/dL 103     VLDL Cholesterol 5 - 40 mg/dL 26.4    LDL/HDL Ratio  2.18    Resulting Agency  [facility]   Narrative     Cholesterol Reference Ranges  (U.S. Department of Health and Human Services ATP III Classifications)    Desirable          <200 mg/dL  Borderline High    200-239 mg/dL  High Risk          >240 mg/dL      Triglyceride Reference Ranges  (U.S. Department of Health and Human Services ATP III Classifications)    Normal           <150 mg/dL  Borderline High  150-199 mg/dL  High             200-499 mg/dL  Very High        >500 mg/dL    HDL Reference Ranges  (U.S. Department of Health and Human Services ATP III Classifcations)    Low     <40 mg/dl (major risk factor for CHD)  High    >60 mg/dl ('negative' risk factor for CHD)        LDL Reference Ranges  (U.S. Department of Health and Human Services ATP III Classifcations)    Optimal          <100 mg/dL  Near Optimal     100-129 mg/dL  Borderline High  130-159 mg/dL  High             160-189 mg/dL  Very High        >189 mg/dL      Specimen Collected: 08/13/18 12:03 Last Resulted: 08/13/18 15:47                        Previously Reviewed Results   Status of Other Orders     Order  Lab Status Result Date Provider Status   ECG 12 Lead Final result 8/13/2018 Open          Encounter Notes      All notes         Routing History     Priority Sent On From To Message Type    8/13/2018  1:19 PM Lab, Background User Aaron Melgar MD Results

## 2018-08-24 ENCOUNTER — TELEPHONE (OUTPATIENT)
Dept: CARDIOLOGY | Facility: CLINIC | Age: 48
End: 2018-08-24

## 2018-08-29 ENCOUNTER — TELEPHONE (OUTPATIENT)
Dept: CARDIOLOGY | Facility: CLINIC | Age: 48
End: 2018-08-29

## 2018-08-29 RX ORDER — AMLODIPINE BESYLATE 10 MG/1
10 TABLET ORAL DAILY
Qty: 30 TABLET | Refills: 5 | Status: SHIPPED | OUTPATIENT
Start: 2018-08-29 | End: 2019-03-07 | Stop reason: SDUPTHER

## 2018-08-29 NOTE — TELEPHONE ENCOUNTER
Pt notified and voiced understanding.  [facility].  [facility]

## 2018-08-29 NOTE — TELEPHONE ENCOUNTER
[phone]    Pt called stating his amlodipine was increased to 5mg 2 weeks ago.  He states his BP is still 140s/90s.  He wonders if you would like to make any changes.      Spoke w pt and he denies any symptoms.     Please advise.  [facility]

## 2018-09-24 ENCOUNTER — OFFICE VISIT (OUTPATIENT)
Dept: INTERNAL MEDICINE | Age: 48
End: 2018-09-24

## 2018-09-24 VITALS
SYSTOLIC BLOOD PRESSURE: 116 MMHG | HEIGHT: 74 IN | WEIGHT: 236.6 LBS | BODY MASS INDEX: 30.36 KG/M2 | OXYGEN SATURATION: 95 % | TEMPERATURE: 97.7 F | DIASTOLIC BLOOD PRESSURE: 80 MMHG | HEART RATE: 65 BPM

## 2018-09-24 DIAGNOSIS — I34.1 BILLOWING MITRAL VALVE: ICD-10-CM

## 2018-09-24 DIAGNOSIS — E83.52 HYPERCALCEMIA: ICD-10-CM

## 2018-09-24 DIAGNOSIS — E66.3 OVERWEIGHT: ICD-10-CM

## 2018-09-24 DIAGNOSIS — Z00.00 ROUTINE HEALTH MAINTENANCE: Primary | ICD-10-CM

## 2018-09-24 DIAGNOSIS — R19.5 OCCULT BLOOD IN STOOLS: Primary | ICD-10-CM

## 2018-09-24 PROBLEM — K64.9 HEMORRHOID: Status: ACTIVE | Noted: 2018-09-24

## 2018-09-24 PROBLEM — H53.9 UNSPECIFIED VISUAL DISTURBANCE: Status: ACTIVE | Noted: 2018-09-24

## 2018-09-24 PROBLEM — H91.90 DECREASED HEARING: Status: ACTIVE | Noted: 2018-09-24

## 2018-09-24 LAB
DEVELOPER EXPIRATION DATE: ABNORMAL
DEVELOPER LOT NUMBER: ABNORMAL
EXPIRATION DATE: ABNORMAL
FECAL OCCULT BLOOD SCREEN, POC: POSITIVE
Lab: ABNORMAL
NEGATIVE CONTROL: NEGATIVE
POSITIVE CONTROL: POSITIVE

## 2018-09-24 PROCEDURE — 82270 OCCULT BLOOD FECES: CPT | Performed by: INTERNAL MEDICINE

## 2018-09-24 PROCEDURE — 99396 PREV VISIT EST AGE 40-64: CPT | Performed by: INTERNAL MEDICINE

## 2018-09-24 NOTE — PROGRESS NOTES
[facility]  MD Adan CRAIG Tien / 47 y.o. / male  09/24/2018      ASSESSMENT & PLAN:    Problem List Items Addressed This Visit        Unprioritized    Billowing mitral valve    Overview     Last echocardiogram 2014.         Relevant Medications    amLODIPine (NORVASC) 10 MG tablet    Other Relevant Orders    Adult Transthoracic Echo Complete W/ Cont if Necessary Per Protocol      Other Visit Diagnoses     Routine health maintenance    -  Primary    Relevant Orders    CBC & Differential    POC Occult Blood Stool    PSA DIAGNOSTIC    Urinalysis With Microscopic If Indicated (No Culture) - Urine, Clean Catch    Overweight        Hypercalcemia        Relevant Orders    Intact PTH (Includes Calcium)        Orders Placed This Encounter   Procedures   • PSA DIAGNOSTIC   • Urinalysis With Microscopic If Indicated (No Culture) - Urine, Clean Catch   • Intact PTH (Includes Calcium)   • POC Occult Blood Stool   • Adult Transthoracic Echo Complete W/ Cont if Necessary Per Protocol   • CBC & Differential       Summary/Discussion:    #1 routine health maintenance, clinically stable young man, see comments above and below.  I main concern today is his weight, we did discuss the importance of weight loss especially in light of his underlying hypertension.  Weight loss May lead to lessening need for medication.  Laboratory as noted above, follow-up results online and adjust treatment if need be.  I suggest patient begin colon cancer screening with colonoscopy in approximate 4 years when he turns 51 since there is no family or personal history of colon cancer or polyps.    #2 history of floppy mitral valve, last echo done about 4 years ago, we'll schedule that with [place] cardiology and address as indicated.    #3 weight control, see discussion under #1.    #4  mild elevation of calcium at 10.7 as noted above.  We'll recheck calcium and PTH and address if need be.      Return in about 6 months  "(around 3/24/2019) for Blood pressure check.    ____________________________________________________________________    VITALS    Visit Vitals  /80   Pulse 65   Temp 97.7 °F (36.5 °C)   Ht 188 cm (74.02\")   Wt 107 kg (236 lb 9.6 oz)   SpO2 95%   BMI 30.36 kg/m²       BP Readings from Last 3 Encounters:   09/24/18 116/80   08/13/18 140/90   03/13/17 140/100     Wt Readings from Last 3 Encounters:   09/24/18 107 kg (236 lb 9.6 oz)   08/13/18 109 kg (241 lb)   03/13/17 107 kg (236 lb)      Body mass index is 30.36 kg/m².    CC:  Main reason(s) for today's visit: Annual Exam (CPE)      HPI:     Patient presents this afternoon for annual physical exam.    Health maintenance: Last ophthalmology visit Lasix done 2018, dentist within the past 2 weeks.  Exercise weight training 3-4 times a week, along with aerobic activity for 10-15 minutes..  We did discuss the utility of increasing his aerobic activity from 10-15 minutes, to more in the realm 1530 minutes 3-5 times a week.    Laboratory August 13, 2018, CMP normal lipids normal, see below for specific values.  The only value out of range was calcium at 10.7 with an upper limit of normal 10.5.    Patient Care Team:  Jorge Blandon MD as PCP - General  Jorge Blandon MD as PCP - Family Medicine  ____________________________________________________________________    REVIEW OF SYSTEMS    Review of Systems   Constitutional: Negative.    HENT: Negative.    Eyes: Negative.    Respiratory: Negative.    Cardiovascular: Negative.    Gastrointestinal: Negative.    Endocrine: Negative.    Genitourinary: Negative.    Musculoskeletal: Negative.    Skin: Negative.    Allergic/Immunologic: Negative for immunocompromised state.   Neurological: Negative.    Hematological: Negative.    Psychiatric/Behavioral: Negative.          PHYSICAL EXAMINATION    Physical Exam   Constitutional: He is oriented to person, place, and time. He appears well-developed. No distress.   O/W   HENT: "   Head: Normocephalic and atraumatic.   Right Ear: Tympanic membrane, external ear and ear canal normal.   Left Ear: Tympanic membrane, external ear and ear canal normal.   Mouth/Throat: Uvula is midline, oropharynx is clear and moist and mucous membranes are normal.   Eyes: Pupils are equal, round, and reactive to light. Conjunctivae and EOM are normal.   Neck: Normal range of motion. Neck supple. No JVD present. Carotid bruit is not present. No thyromegaly present.   Cardiovascular: Normal rate, regular rhythm, normal heart sounds and intact distal pulses.  Exam reveals no gallop and no friction rub.    No murmur heard.  Pulmonary/Chest: Effort normal and breath sounds normal. He has no wheezes. He has no rales.   Abdominal: Soft. Bowel sounds are normal. There is no hepatosplenomegaly. There is no tenderness. Hernia confirmed negative in the right inguinal area and confirmed negative in the left inguinal area.   Genitourinary: Rectum normal, testes normal and penis normal.   Genitourinary Comments: Prostate was enlarged, but smooth and soft.   Musculoskeletal: Normal range of motion. He exhibits no edema or deformity.   Lymphadenopathy:     He has no cervical adenopathy.   Neurological: He is alert and oriented to person, place, and time. He has normal strength and normal reflexes. No cranial nerve deficit or sensory deficit. Coordination normal.   Skin: Skin is warm and dry. No rash noted.   Psychiatric: He has a normal mood and affect. His speech is normal and behavior is normal. Judgment and thought content normal. Cognition and memory are normal.   Nursing note and vitals reviewed.        REVIEWED DATA:    Labs:     Lab Results   Component Value Date     08/13/2018    K 4.1 08/13/2018    AST 20 08/13/2018    ALT 20 08/13/2018    BUN 18 08/13/2018    CREATININE 1.10 08/13/2018    CREATININE 1.17 02/12/2017    CREATININE 1.16 10/01/2014    EGFRIFNONA 72 08/13/2018       Lab Results   Component Value Date     GLUCOSE 95 08/13/2018    GLUCOSE 111 (H) 02/12/2017    GLUCOSE 99 10/01/2014       Lab Results   Component Value Date     (H) 08/13/2018    HDL 47 08/13/2018    TRIG 132 08/13/2018       Lab Results   Component Value Date    TSH 1.370 02/12/2017    FREET4 1.06 02/12/2017       Lab Results   Component Value Date    WBC 8.36 02/12/2017    HGB 15.5 02/12/2017    HGB 15.5 10/01/2014     02/12/2017       No results found for: PSA      Imaging:         Medical Tests:         Summary of old records / correspondence / consultant report:         Request outside records:         ALLERGIES  No Known Allergies     MEDICATIONS  Current Outpatient Prescriptions   Medication Sig Dispense Refill   • amLODIPine (NORVASC) 10 MG tablet Take 1 tablet by mouth Daily. 30 tablet 5   • aspirin 81 MG tablet Take 81 mg by mouth Daily.     • Calcium Carbonate-Vit D-Min (CALCIUM 1200 PO) Take  by mouth Daily.     • cetirizine (zyrTEC) 10 MG tablet Take 10 mg by mouth Daily.     • Multiple Vitamins-Minerals (MULTI COMPLETE PO) Take  by mouth Daily.       No current facility-administered medications for this visit.        PFSH:     The following portions of the patient's history were reviewed and updated as appropriate: Allergies / Current Medications / Past Medical History / Surgical History / Social History / Family History    PROBLEM LIST   Patient Active Problem List   Diagnosis   • Decreased hearing   • Hemorrhoid   • Billowing mitral valve   • Unspecified visual disturbance       PAST MEDICAL HISTORY  Past Medical History:   Diagnosis Date   • Chest pain    • Emphysema lung (CMS/HCC)     mild   • Hypertension    • Lightheadedness    • MVP (mitral valve prolapse)    • Palpitations    • Sinus tachycardia    • Visual disturbances        SURGICAL HISTORY  Past Surgical History:   Procedure Laterality Date   • ADENOIDECTOMY     • CARPAL TUNNEL RELEASE     • HEMORROIDECTOMY     • TONSILLECTOMY         SOCIAL HISTORY  Social  History     Social History   • Marital status:      Spouse name: Lizzy   • Number of children: 3     Occupational History   • IT      Social History Main Topics   • Smoking status: Former Smoker     Years: 15.00     Types: Cigarettes     Start date: 1985     Quit date: 2000   • Smokeless tobacco: Never Used   • Alcohol use 4.2 oz/week     7 Glasses of wine per week      Comment: 1 g/o red wine daily w/dinner   • Drug use: No   • Sexual activity: Defer     Other Topics Concern   • Not on file       FAMILY HISTORY  Family History   Problem Relation Age of Onset   • Stroke Father    • Stroke Paternal Uncle    • Hypertension Paternal Uncle    • Hypertension Paternal Uncle        Health Maintenance Due   Topic   • INFLUENZA VACCINE        Overdue health maintenance interventions  reviewed with patient.    Dictated utilizing Dragon voice recognition software

## 2018-09-24 NOTE — PROGRESS NOTES
Laboratory shows presence of blood in the stool.  Because of this, I would suggest that you be seen by gastroenterologist for colonoscopy.  I will place a consultation for GI to see you, please let me know if you have not heard from scheduling within the next week.

## 2018-09-25 DIAGNOSIS — I34.1 BILLOWING MITRAL VALVE: Primary | ICD-10-CM

## 2018-09-25 LAB
APPEARANCE UR: CLEAR
BASOPHILS # BLD AUTO: 0.02 10*3/MM3 (ref 0–0.2)
BASOPHILS NFR BLD AUTO: 0.2 % (ref 0–1.5)
BILIRUB UR QL STRIP: NEGATIVE
COLOR UR: YELLOW
EOSINOPHIL # BLD AUTO: 0.2 10*3/MM3 (ref 0–0.7)
EOSINOPHIL NFR BLD AUTO: 2.2 % (ref 0.3–6.2)
ERYTHROCYTE [DISTWIDTH] IN BLOOD BY AUTOMATED COUNT: 13.6 % (ref 11.5–14.5)
GLUCOSE UR QL: NEGATIVE
HCT VFR BLD AUTO: 50.4 % (ref 40.4–52.2)
HGB BLD-MCNC: 17.2 G/DL (ref 13.7–17.6)
HGB UR QL STRIP: NEGATIVE
IMM GRANULOCYTES # BLD: 0.01 10*3/MM3 (ref 0–0.03)
IMM GRANULOCYTES NFR BLD: 0.1 % (ref 0–0.5)
KETONES UR QL STRIP: NEGATIVE
LEUKOCYTE ESTERASE UR QL STRIP: NEGATIVE
LYMPHOCYTES # BLD AUTO: 2.39 10*3/MM3 (ref 0.9–4.8)
LYMPHOCYTES NFR BLD AUTO: 25.8 % (ref 19.6–45.3)
MCH RBC QN AUTO: 31.6 PG (ref 27–32.7)
MCHC RBC AUTO-ENTMCNC: 34.1 G/DL (ref 32.6–36.4)
MCV RBC AUTO: 92.6 FL (ref 79.8–96.2)
MONOCYTES # BLD AUTO: 0.72 10*3/MM3 (ref 0.2–1.2)
MONOCYTES NFR BLD AUTO: 7.8 % (ref 5–12)
NEUTROPHILS # BLD AUTO: 5.93 10*3/MM3 (ref 1.9–8.1)
NEUTROPHILS NFR BLD AUTO: 64 % (ref 42.7–76)
NITRITE UR QL STRIP: NEGATIVE
PH UR STRIP: 6.5 [PH] (ref 5–8)
PLATELET # BLD AUTO: 302 10*3/MM3 (ref 140–500)
PROT UR QL STRIP: NEGATIVE
PSA SERPL-MCNC: 3.28 NG/ML (ref 0–4)
RBC # BLD AUTO: 5.44 10*6/MM3 (ref 4.6–6)
SP GR UR: 1.01 (ref 1–1.03)
UROBILINOGEN UR STRIP-MCNC: NORMAL MG/DL
WBC # BLD AUTO: 9.26 10*3/MM3 (ref 4.5–10.7)

## 2018-09-25 NOTE — PROGRESS NOTES
Laboratory is normal.  I will be in touch with the results of the parathyroid hormone, which was not done, when they are available.  I apologize for the inconvenience of the repeat laboratory testing.  Please continue present dose of medications and repeat the lab in 1 year.

## 2018-09-26 ENCOUNTER — PREP FOR SURGERY (OUTPATIENT)
Dept: OTHER | Facility: HOSPITAL | Age: 48
End: 2018-09-26

## 2018-09-26 DIAGNOSIS — R19.5 HEME + STOOL: Primary | ICD-10-CM

## 2018-09-26 DIAGNOSIS — I34.1 BILLOWING MITRAL VALVE: Primary | ICD-10-CM

## 2018-09-26 LAB
CALCIUM SERPL-MCNC: 10.5 MG/DL (ref 8.7–10.2)
PTH-INTACT SERPL-MCNC: NORMAL PG/ML
SPECIMEN STATUS: NORMAL

## 2018-09-26 RX ORDER — SODIUM CHLORIDE, SODIUM LACTATE, POTASSIUM CHLORIDE, CALCIUM CHLORIDE 600; 310; 30; 20 MG/100ML; MG/100ML; MG/100ML; MG/100ML
30 INJECTION, SOLUTION INTRAVENOUS CONTINUOUS
Status: CANCELLED | OUTPATIENT
Start: 2018-10-22

## 2018-09-27 ENCOUNTER — TELEPHONE (OUTPATIENT)
Dept: INTERNAL MEDICINE | Age: 48
End: 2018-09-27

## 2018-09-27 PROBLEM — R19.5 HEME + STOOL: Status: ACTIVE | Noted: 2018-09-27

## 2018-09-28 DIAGNOSIS — E83.52 HYPERCALCEMIA: Primary | ICD-10-CM

## 2018-09-28 LAB — PTH-INTACT SERPL-MCNC: 29 PG/ML (ref 15–65)

## 2018-09-28 NOTE — PROGRESS NOTES
Parathyroid hormone is normal, I would discontinue all exogenous sources of calcium to include supplements and multivitamin.  Repeat calcium in 8 weeks.

## 2018-10-03 ENCOUNTER — RESULTS ENCOUNTER (OUTPATIENT)
Dept: INTERNAL MEDICINE | Age: 48
End: 2018-10-03

## 2018-10-03 DIAGNOSIS — E83.52 HYPERCALCEMIA: ICD-10-CM

## 2018-10-19 ENCOUNTER — TELEPHONE (OUTPATIENT)
Dept: GASTROENTEROLOGY | Facility: CLINIC | Age: 48
End: 2018-10-19

## 2018-10-22 ENCOUNTER — ANESTHESIA (OUTPATIENT)
Dept: GASTROENTEROLOGY | Facility: HOSPITAL | Age: 48
End: 2018-10-22

## 2018-10-22 ENCOUNTER — ANESTHESIA EVENT (OUTPATIENT)
Dept: GASTROENTEROLOGY | Facility: HOSPITAL | Age: 48
End: 2018-10-22

## 2018-10-22 ENCOUNTER — HOSPITAL ENCOUNTER (OUTPATIENT)
Facility: HOSPITAL | Age: 48
Setting detail: HOSPITAL OUTPATIENT SURGERY
Discharge: HOME | End: 2018-10-22
Attending: INTERNAL MEDICINE | Admitting: INTERNAL MEDICINE

## 2018-10-22 VITALS
SYSTOLIC BLOOD PRESSURE: 122 MMHG | OXYGEN SATURATION: 96 % | HEART RATE: 70 BPM | WEIGHT: 238.13 LBS | RESPIRATION RATE: 16 BRPM | TEMPERATURE: 98.3 F | DIASTOLIC BLOOD PRESSURE: 99 MMHG | HEIGHT: 74 IN | BODY MASS INDEX: 30.56 KG/M2

## 2018-10-22 DIAGNOSIS — R19.5 HEME + STOOL: ICD-10-CM

## 2018-10-22 PROCEDURE — 25010000002 PROPOFOL 10 MG/ML EMULSION: Performed by: ANESTHESIOLOGY

## 2018-10-22 PROCEDURE — 45378 DIAGNOSTIC COLONOSCOPY: CPT | Performed by: INTERNAL MEDICINE

## 2018-10-22 RX ORDER — SODIUM CHLORIDE, SODIUM LACTATE, POTASSIUM CHLORIDE, CALCIUM CHLORIDE 600; 310; 30; 20 MG/100ML; MG/100ML; MG/100ML; MG/100ML
30 INJECTION, SOLUTION INTRAVENOUS CONTINUOUS
Status: DISCONTINUED | OUTPATIENT
Start: 2018-10-22 | End: 2018-10-22 | Stop reason: HOSPADM

## 2018-10-22 RX ORDER — PROPOFOL 10 MG/ML
VIAL (ML) INTRAVENOUS CONTINUOUS PRN
Status: DISCONTINUED | OUTPATIENT
Start: 2018-10-22 | End: 2018-10-22 | Stop reason: SURG

## 2018-10-22 RX ORDER — PROPOFOL 10 MG/ML
VIAL (ML) INTRAVENOUS AS NEEDED
Status: DISCONTINUED | OUTPATIENT
Start: 2018-10-22 | End: 2018-10-22 | Stop reason: SURG

## 2018-10-22 RX ORDER — SODIUM CHLORIDE 0.9 % (FLUSH) 0.9 %
3 SYRINGE (ML) INJECTION AS NEEDED
Status: DISCONTINUED | OUTPATIENT
Start: 2018-10-22 | End: 2018-10-22 | Stop reason: HOSPADM

## 2018-10-22 RX ORDER — LIDOCAINE HYDROCHLORIDE 20 MG/ML
INJECTION, SOLUTION INFILTRATION; PERINEURAL AS NEEDED
Status: DISCONTINUED | OUTPATIENT
Start: 2018-10-22 | End: 2018-10-22 | Stop reason: SURG

## 2018-10-22 RX ADMIN — PROPOFOL 30 MG: 10 INJECTION, EMULSION INTRAVENOUS at 10:21

## 2018-10-22 RX ADMIN — SODIUM CHLORIDE, POTASSIUM CHLORIDE, SODIUM LACTATE AND CALCIUM CHLORIDE 30 ML/HR: 600; 310; 30; 20 INJECTION, SOLUTION INTRAVENOUS at 09:58

## 2018-10-22 RX ADMIN — PROPOFOL 120 MCG/KG/MIN: 10 INJECTION, EMULSION INTRAVENOUS at 10:12

## 2018-10-22 RX ADMIN — PROPOFOL 210 MG: 10 INJECTION, EMULSION INTRAVENOUS at 10:08

## 2018-10-22 RX ADMIN — LIDOCAINE HYDROCHLORIDE 50 MG: 20 INJECTION, SOLUTION INFILTRATION; PERINEURAL at 10:08

## 2018-10-22 RX ADMIN — PROPOFOL 30 MG: 10 INJECTION, EMULSION INTRAVENOUS at 10:16

## 2018-10-22 NOTE — ANESTHESIA POSTPROCEDURE EVALUATION
"Patient: Adan Chauhan    Procedure Summary     Date:  10/22/18 Room / Location:   [facility]    Anesthesia Start:  1004 Anesthesia Stop:  1033    Procedure:  COLONOSCOPY TO CECUM (N/A ) Diagnosis:       Heme + stool      (Heme + stool [R19.5])    Surgeon:  Flaco Fountain MD Provider:  Nely Jacobson MD    Anesthesia Type:  MAC ASA Status:  3          Anesthesia Type: MAC  Last vitals  BP   (!) 135/112 (10/22/18 1033)   Temp   36.8 °C (98.3 °F) (10/22/18 0942)   Pulse   88 (10/22/18 1033)   Resp   16 (10/22/18 1033)     SpO2   96 % (10/22/18 1033)     Post Anesthesia Care and Evaluation    Patient location during evaluation: bedside  Patient participation: complete - patient participated  Level of consciousness: awake and alert  Pain management: adequate  Airway patency: patent  Anesthetic complications: No anesthetic complications    Cardiovascular status: acceptable  Respiratory status: acceptable  Hydration status: acceptable    Comments: BP (!) 135/112 (BP Location: Left arm, Patient Position: Lying)   Pulse 88   Temp 36.8 °C (98.3 °F) (Oral)   Resp 16   Ht 188 cm (74\")   Wt 108 kg (238 lb 2 oz)   SpO2 96%   BMI 30.57 kg/m²       "

## 2018-10-22 NOTE — ANESTHESIA PREPROCEDURE EVALUATION
Anesthesia Evaluation     Patient summary reviewed and Nursing notes reviewed   NPO Solid Status: > 8 hours  NPO Liquid Status: > 2 hours           Airway   Mallampati: II  TM distance: >3 FB  Neck ROM: full  Dental    (+) partials    Pulmonary - normal exam   (+) a smoker Former, COPD,   Cardiovascular - normal exam    (+) hypertension, valvular problems/murmurs MVP,       Neuro/Psych- negative ROS  GI/Hepatic/Renal/Endo - negative ROS     Musculoskeletal (-) negative ROS    Abdominal    Substance History - negative use     OB/GYN negative ob/gyn ROS         Other                      Anesthesia Plan    ASA 3     MAC     Anesthetic plan, all risks, benefits, and alternatives have been provided, discussed and informed consent has been obtained with: patient.

## 2018-10-22 NOTE — H&P
CC: Here for endoscopic evaluation.     HPI: Heme + stool    Past Medical History:   Diagnosis Date   • Chest pain    • Emphysema lung (CMS/HCC)     mild   • Hypertension    • Lightheadedness    • MVP (mitral valve prolapse)    • Palpitations    • Sinus tachycardia    • Visual disturbances        Past Surgical History:   Procedure Laterality Date   • ADENOIDECTOMY     • CARPAL TUNNEL RELEASE     • HEMORROIDECTOMY     • TONSILLECTOMY         Prior to Admission medications    Medication Sig Start Date End Date Taking? Authorizing Provider   amLODIPine (NORVASC) 10 MG tablet Take 1 tablet by mouth Daily. 8/29/18  Yes Aaron Melgar MD   aspirin 81 MG tablet Take 81 mg by mouth Daily.   Yes Ludmila Nguyen MD   cetirizine (zyrTEC) 10 MG tablet Take 10 mg by mouth Daily.   Yes Ludmila Nguyen MD   Calcium Carbonate-Vit D-Min (CALCIUM 1200 PO) Take  by mouth Daily.    Ludmila Nguyen MD   Multiple Vitamins-Minerals (MULTI COMPLETE PO) Take  by mouth Daily.    Ludmila Nguyen MD       No Known Allergies    Family History   Problem Relation Age of Onset   • Stroke Father    • Stroke Paternal Uncle    • Hypertension Paternal Uncle    • Hypertension Paternal Uncle        OBJECTIVE:    Patient's vital signs reviewed. No acute distress.     Chest is clear, no wheezing or rales. Normal symmetric air entry throughout both lung fields. No chest wall deformities or tenderness.    S1 and S2 normal, no murmurs, clicks, gallops or rubs. Regular rate and rhythm. Chest is clear; no wheezes or rales. No edema or JVD.    The abdomen is soft without tenderness, guarding, mass, rebound or organomegaly. Bowel sounds are normal. No CVA tenderness or inguinal adenopathy noted.    Patient is alert, oriented and with an intact memory.    Assessment: Heme + stool    Plan: Colonoscopy.

## 2019-03-08 RX ORDER — AMLODIPINE BESYLATE 10 MG/1
TABLET ORAL
Qty: 30 TABLET | Refills: 4 | Status: SHIPPED | OUTPATIENT
Start: 2019-03-08 | End: 2019-08-26 | Stop reason: SDUPTHER

## 2019-08-26 RX ORDER — AMLODIPINE BESYLATE 10 MG/1
10 TABLET ORAL DAILY
Qty: 30 TABLET | Refills: 2 | Status: SHIPPED | OUTPATIENT
Start: 2019-08-26 | End: 2019-10-03 | Stop reason: SDUPTHER

## 2019-10-03 ENCOUNTER — OFFICE VISIT (OUTPATIENT)
Dept: INTERNAL MEDICINE | Age: 49
End: 2019-10-03

## 2019-10-03 VITALS
DIASTOLIC BLOOD PRESSURE: 70 MMHG | TEMPERATURE: 97.6 F | SYSTOLIC BLOOD PRESSURE: 110 MMHG | OXYGEN SATURATION: 98 % | WEIGHT: 234.4 LBS | RESPIRATION RATE: 13 BRPM | BODY MASS INDEX: 30.08 KG/M2 | HEIGHT: 74 IN | HEART RATE: 71 BPM

## 2019-10-03 DIAGNOSIS — R06.83 SNORING: ICD-10-CM

## 2019-10-03 DIAGNOSIS — Z00.00 ANNUAL PHYSICAL EXAM: Primary | ICD-10-CM

## 2019-10-03 DIAGNOSIS — R13.19 ESOPHAGEAL DYSPHAGIA: ICD-10-CM

## 2019-10-03 DIAGNOSIS — E83.52 HYPERCALCEMIA: ICD-10-CM

## 2019-10-03 DIAGNOSIS — I10 ESSENTIAL HYPERTENSION: ICD-10-CM

## 2019-10-03 LAB
ALBUMIN SERPL-MCNC: 4.3 G/DL (ref 3.5–5.2)
ALBUMIN/GLOB SERPL: 1.7 G/DL
ALP SERPL-CCNC: 80 U/L (ref 39–117)
ALT SERPL-CCNC: 22 U/L (ref 1–41)
APPEARANCE UR: CLEAR
AST SERPL-CCNC: 17 U/L (ref 1–40)
BASOPHILS # BLD AUTO: 0.03 10*3/MM3 (ref 0–0.2)
BASOPHILS NFR BLD AUTO: 0.4 % (ref 0–1.5)
BILIRUB SERPL-MCNC: 0.4 MG/DL (ref 0.2–1.2)
BILIRUB UR QL STRIP: NEGATIVE
BUN SERPL-MCNC: 14 MG/DL (ref 6–20)
BUN/CREAT SERPL: 12 (ref 7–25)
CALCIUM SERPL-MCNC: 10.2 MG/DL (ref 8.6–10.5)
CHLORIDE SERPL-SCNC: 104 MMOL/L (ref 98–107)
CHOLEST SERPL-MCNC: 181 MG/DL (ref 0–200)
CO2 SERPL-SCNC: 26.2 MMOL/L (ref 22–29)
COLOR UR: YELLOW
CREAT SERPL-MCNC: 1.17 MG/DL (ref 0.76–1.27)
EOSINOPHIL # BLD AUTO: 0.21 10*3/MM3 (ref 0–0.4)
EOSINOPHIL NFR BLD AUTO: 3.1 % (ref 0.3–6.2)
ERYTHROCYTE [DISTWIDTH] IN BLOOD BY AUTOMATED COUNT: 13.5 % (ref 12.3–15.4)
GLOBULIN SER CALC-MCNC: 2.6 GM/DL
GLUCOSE SERPL-MCNC: 101 MG/DL (ref 65–99)
GLUCOSE UR QL: NEGATIVE
HCT VFR BLD AUTO: 50.7 % (ref 37.5–51)
HDLC SERPL-MCNC: 47 MG/DL (ref 40–60)
HGB BLD-MCNC: 16.5 G/DL (ref 13–17.7)
HGB UR QL STRIP: NEGATIVE
IMM GRANULOCYTES # BLD AUTO: 0.01 10*3/MM3 (ref 0–0.05)
IMM GRANULOCYTES NFR BLD AUTO: 0.1 % (ref 0–0.5)
KETONES UR QL STRIP: NEGATIVE
LDLC SERPL CALC-MCNC: 104 MG/DL (ref 0–100)
LEUKOCYTE ESTERASE UR QL STRIP: NEGATIVE
LYMPHOCYTES # BLD AUTO: 1.55 10*3/MM3 (ref 0.7–3.1)
LYMPHOCYTES NFR BLD AUTO: 22.9 % (ref 19.6–45.3)
MCH RBC QN AUTO: 30.2 PG (ref 26.6–33)
MCHC RBC AUTO-ENTMCNC: 32.5 G/DL (ref 31.5–35.7)
MCV RBC AUTO: 92.7 FL (ref 79–97)
MONOCYTES # BLD AUTO: 0.62 10*3/MM3 (ref 0.1–0.9)
MONOCYTES NFR BLD AUTO: 9.2 % (ref 5–12)
NEUTROPHILS # BLD AUTO: 4.34 10*3/MM3 (ref 1.7–7)
NEUTROPHILS NFR BLD AUTO: 64.3 % (ref 42.7–76)
NITRITE UR QL STRIP: NEGATIVE
NRBC BLD AUTO-RTO: 0 /100 WBC (ref 0–0.2)
PH UR STRIP: 7 [PH] (ref 5–8)
PLATELET # BLD AUTO: 295 10*3/MM3 (ref 140–450)
POTASSIUM SERPL-SCNC: 4.3 MMOL/L (ref 3.5–5.2)
PROT SERPL-MCNC: 6.9 G/DL (ref 6–8.5)
PROT UR QL STRIP: NEGATIVE
PSA SERPL-MCNC: 3.3 NG/ML (ref 0–4)
RBC # BLD AUTO: 5.47 10*6/MM3 (ref 4.14–5.8)
SODIUM SERPL-SCNC: 141 MMOL/L (ref 136–145)
SP GR UR: 1.03 (ref 1–1.03)
T4 FREE SERPL-MCNC: 1.16 NG/DL (ref 0.93–1.7)
TRIGL SERPL-MCNC: 148 MG/DL (ref 0–150)
TSH SERPL DL<=0.005 MIU/L-ACNC: 2.15 UIU/ML (ref 0.27–4.2)
UROBILINOGEN UR STRIP-MCNC: NORMAL MG/DL
VLDLC SERPL CALC-MCNC: 29.6 MG/DL
WBC # BLD AUTO: 6.76 10*3/MM3 (ref 3.4–10.8)

## 2019-10-03 PROCEDURE — 99396 PREV VISIT EST AGE 40-64: CPT | Performed by: INTERNAL MEDICINE

## 2019-10-03 RX ORDER — AMLODIPINE BESYLATE 10 MG/1
10 TABLET ORAL DAILY
Qty: 90 TABLET | Refills: 2 | Status: SHIPPED | OUTPATIENT
Start: 2019-10-03 | End: 2020-09-28 | Stop reason: SDUPTHER

## 2019-10-03 NOTE — PROGRESS NOTES
"  Adan Chauhan is a 48 y.o. male who presents with   Chief Complaint   Patient presents with   • Annual Exam     Former patient of Dr. Blandon   • Hypertension     Amlodipine 10 mg daily   • History of elevated calcium     No treatment   • Snoring     As noted.  Patient says he feels rested in the morning.  He says his wife has not noticed any apneic episodes while he sleeps.   • Difficulty swallowing meats and salads     Difficulty as noted.  Problem occurs \"once or twice weekly\".  No symptoms of GERD   .    48-year-old male.  Former patient of Dr. Blandon.  Presents for his annual physical.  History of hypertension and elevated calcium in the past.  Rest of his history reviewed as noted.  Ancillary issues on today's visit include snoring as noted and difficulty swallowing meats and salads as noted above.      Hypertension   This is a chronic problem. The current episode started more than 1 year ago. The problem is controlled. Current antihypertension treatment includes calcium channel blockers. The current treatment provides moderate improvement. Compliance problems include diet and exercise.    Snoring   This is a chronic problem. The current episode started more than 1 year ago. The problem has been waxing and waning. He has tried nothing for the symptoms.        The following portions of the patient's history were reviewed and updated as appropriate: allergies, current medications, past family history, past medical history, past social history, past surgical history and problem list.    Review of Systems   Constitutional: Negative.    HENT: Negative.    Eyes: Negative.    Respiratory: Positive for snoring.    Cardiovascular: Negative.         Prior past history of mitral valve prolapse   Gastrointestinal:        Dysphagia symptoms as noted above with meats and salads   Genitourinary: Negative.    Musculoskeletal: Negative.    Skin: Negative.    Neurological: Negative.    Psychiatric/Behavioral: Negative.  "       Objective   Physical Exam   Constitutional: He is oriented to person, place, and time. He appears well-developed and well-nourished. No distress.   48-year-old male who appears healthy and other than the issues as outlined above generally seems in good health.   HENT:   Head: Normocephalic and atraumatic.   Right Ear: External ear normal.   Left Ear: External ear normal.   Nose: Nose normal.   Mouth/Throat: Oropharynx is clear and moist. No oropharyngeal exudate.   Eyes: Conjunctivae and EOM are normal. Pupils are equal, round, and reactive to light. Right eye exhibits no discharge. Left eye exhibits no discharge. No scleral icterus.   Neck: Normal range of motion. Neck supple. No JVD present. No tracheal deviation present. No thyromegaly present.   Neck exam negative.  Carotid auscultation normal-no bruits heard.   Cardiovascular: Normal rate, regular rhythm, normal heart sounds and intact distal pulses. Exam reveals no gallop and no friction rub.   No murmur heard.  Pulmonary/Chest: Effort normal and breath sounds normal. No respiratory distress. He has no wheezes. He has no rales. He exhibits no tenderness.   Abdominal: Soft. Bowel sounds are normal. He exhibits no distension and no mass. There is no tenderness. No hernia.   Genitourinary:   Genitourinary Comments: Patient declines digital rectal exam and physical exam for hernia.   Musculoskeletal: Normal range of motion. He exhibits no edema, tenderness or deformity.   Lymphadenopathy:     He has no cervical adenopathy.   Neurological: He is alert and oriented to person, place, and time. He has normal reflexes. He displays normal reflexes. No cranial nerve deficit. He exhibits normal muscle tone. Coordination normal.   Skin: Skin is warm and dry. No rash noted. He is not diaphoretic. No erythema. No pallor.   Psychiatric: He has a normal mood and affect. His behavior is normal. Judgment and thought content normal.   Nursing note and vitals  reviewed.      Assessment/Plan   Adan was seen today for annual exam, hypertension, history of elevated calcium, snoring and difficulty swallowing meats and salads.    Diagnoses and all orders for this visit:    Annual physical exam  -     CBC & Differential  -     Comprehensive Metabolic Panel  -     Lipid Panel  -     PSA Screen  -     TSH+Free T4  -     Urinalysis With Microscopic If Indicated (No Culture) - Urine, Clean Catch    Essential hypertension  -     CBC & Differential  -     Comprehensive Metabolic Panel  -     Lipid Panel  -     TSH+Free T4    Hypercalcemia  -     Comprehensive Metabolic Panel  -     TSH+Free T4    Snoring  -     Ambulatory Referral to Sleep Medicine    Esophageal dysphagia  -     Ambulatory Referral to Gastroenterology    Other orders  -     amLODIPine (NORVASC) 10 MG tablet; Take 1 tablet by mouth Daily.      Plan: Labs as above for the issues as outlined.  Assuming today's labs are acceptable we will see him on an annual basis for a a physical or at least a general checkup with any lab updates that are necessary on that particular today's visit.    Continue all treatment as prescribed pending today's lab results.    We will make ambulatory referrals to sleep medicine for possible sleep apnea and to gastroenterology for his complaints of dysphagia as noted.    Included in today's exam was face to face time spent in preventative counseling regarding weight loss, daily exercise, and minimizing sweets and carbohydrates.

## 2019-10-04 ENCOUNTER — PREP FOR SURGERY (OUTPATIENT)
Dept: OTHER | Facility: HOSPITAL | Age: 49
End: 2019-10-04

## 2019-10-04 DIAGNOSIS — R13.19 ESOPHAGEAL DYSPHAGIA: Primary | ICD-10-CM

## 2019-10-04 RX ORDER — SODIUM CHLORIDE, SODIUM LACTATE, POTASSIUM CHLORIDE, CALCIUM CHLORIDE 600; 310; 30; 20 MG/100ML; MG/100ML; MG/100ML; MG/100ML
30 INJECTION, SOLUTION INTRAVENOUS CONTINUOUS
Status: CANCELLED | OUTPATIENT
Start: 2019-10-04

## 2019-10-04 NOTE — PROGRESS NOTES
Recent labs shows normal blood count with no evidence of anemia.  The comprehensive metabolic panel shows a slight increase in blood sugar at 101.  Weight loss with minimizing sweets and carbohydrates would be advised.  The lipids (cholesterol, triglycerides, LDL and HDL) remain within ranges of acceptability with minor variation over the past year.  Thyroid testing is normal.  Urinalysis shows no evidence of any abnormalities.  Finally, the PSA although normal at 3.3 and basically unchanged from a year ago at 3.2, is on the upper end of normal and is in general higher than one would expect to see in a 48-year-old male.  At this point I would suggest that we see you in 6 months for a brief office checkup and a follow-up blood test to recheck the PSA level.

## 2019-10-07 ENCOUNTER — TELEPHONE (OUTPATIENT)
Dept: GASTROENTEROLOGY | Facility: CLINIC | Age: 49
End: 2019-10-07

## 2019-10-07 PROBLEM — R13.19 ESOPHAGEAL DYSPHAGIA: Status: ACTIVE | Noted: 2019-10-07

## 2019-10-07 NOTE — TELEPHONE ENCOUNTER
Patient called canceled EGD scheduled for 10-11-19. Said he cannot afford to do it right now.. Will call sometime next year to schedule. He has a high deductible Insurance.

## 2019-10-31 ENCOUNTER — OFFICE VISIT (OUTPATIENT)
Dept: SLEEP MEDICINE | Facility: HOSPITAL | Age: 49
End: 2019-10-31

## 2019-10-31 VITALS
WEIGHT: 238 LBS | BODY MASS INDEX: 30.54 KG/M2 | SYSTOLIC BLOOD PRESSURE: 161 MMHG | OXYGEN SATURATION: 97 % | HEART RATE: 78 BPM | HEIGHT: 74 IN | DIASTOLIC BLOOD PRESSURE: 79 MMHG

## 2019-10-31 DIAGNOSIS — G47.30 OBSERVED SLEEP APNEA: Primary | ICD-10-CM

## 2019-10-31 DIAGNOSIS — I10 ESSENTIAL HYPERTENSION: ICD-10-CM

## 2019-10-31 DIAGNOSIS — R06.83 SNORING: ICD-10-CM

## 2019-10-31 PROCEDURE — G0463 HOSPITAL OUTPT CLINIC VISIT: HCPCS

## 2019-10-31 PROCEDURE — 99244 OFF/OP CNSLTJ NEW/EST MOD 40: CPT | Performed by: INTERNAL MEDICINE

## 2019-10-31 NOTE — PROGRESS NOTES
[facility]  [address] [address]  Phone   Fax       Adan Chauhan  1970  49 y.o.  male      PCP:Isaac Ruelas MD  Referring Provider same    Type of service: Initial consult  Date of service: 10/31/2019      Chief Complaint   Patient presents with   • Witnessed Apnea   • Snoring   • Daytime Sleepiness   • Fatigue       History of present illness;  Thank you for asking to see Adan Chauhan, 49 y.o.  for evaluation of sleep apnea. The patient was seen today on 10/31/2019 at [facility].   Patient's wife is a registered nurse who keeps telling him that he snores.  Patient do admit that he has daytime excessive sleepiness and fatigue is an .  In the last 2 years his snoring has gotten worse progressively.    Patient gives the following sleep history.  Sleep schedule:  Bedtime: 11 PM  Wake time: 6:15 AM  Normally takes about 5-10 minutes to fall asleep  Average hours of sleep 6  Number of naps per day none  When patient wakes up still feels tired and not rested  Snoring; yes  Witnessed apneas; yes  Have you ever awakened gasping for breath, coughing, choking: No      Past Medical History:   Diagnosis Date   • Chest pain    • Hypertension    • Lightheadedness    • MVP (mitral valve prolapse)    • Palpitations    • Sinus tachycardia    • Visual disturbances        Social history:  Shift work: No  Tobacco use: No  Alcohol use: 2 glasses of wine  Caffeinated drinks: 2  Over-the-counter sleeping aid and medications: No  Narcotic medications: No    Family Hx  Family history of sleep apnea, negative  Family History   Problem Relation Age of Onset   • Stroke Father    • Stroke Paternal Uncle    • Hypertension Paternal Uncle    • Hypertension Paternal Uncle    • No Known Problems Maternal Grandfather    • No Known Problems Paternal Grandmother    • No Known Problems Paternal Grandfather    • No Known Problems Other   "      Medications: reviewed    Current Outpatient Medications:   •  amLODIPine (NORVASC) 10 MG tablet, Take 1 tablet by mouth Daily., Disp: 90 tablet, Rfl: 2  •  aspirin 81 MG tablet, Take 81 mg by mouth Daily., Disp: , Rfl:   •  Calcium Carbonate-Vit D-Min (CALCIUM 1200 PO), Take  by mouth Daily., Disp: , Rfl:   •  cetirizine (zyrTEC) 10 MG tablet, Take 10 mg by mouth Daily., Disp: , Rfl:   •  Multiple Vitamins-Minerals (MULTI COMPLETE PO), Take  by mouth Daily., Disp: , Rfl:     Review of systems:  [place] Sleepiness Scale: Total score: 6   Positive for snoring, witnessed apneas, fatigue and daytime excessive sleepiness,   Negative for shortness of breath, cough, wheezing, chest pain, nausea and vomiting, palpitation, swelling of feet:    Morning headaches: No  Awaken with sore throat or dry mouth : No  Leg jerking at night: No  Crawly feeling/urge sensation to move in the legs: No  Teeth grinding: yes  Sleepwalking, nightmares, muscle weakness with laughing or anger,sleep paralysis: No  Nasal Congestion: No  Nocturia (how many times/night): Maybe 1  Memory Problem: No  Change in weight, no    Physical exam:  Vitals:    10/31/19 1436   BP: 161/79   Pulse: 78   SpO2: 97%   Weight: 108 kg (238 lb)   Height: 188 cm (74\")    Body mass index is 30.56 kg/m². Neck Circumference: 16 inches  HEENT: Head is atraumatic, normocephalic   Eyes:pupils are round equal and reacting to light and accommodation, conjunctiva normal  Nose:no nasal septal defects or deviation and the nasal passages are clear, no nasal polyps,   Throat: tonsils are not enlarged, tongue normal size, oral airway Mallampati class 2  NECK: No lymphadenopathy, trachea is in the midline, thyroid not enlarged  RESPIRATORY SYSTEM: Breath sounds are equal on both sides, there are no wheezes or crackles  CARDIOVASULAR SYSTEM: Heart sounds are regular rhythm and pb rate, there are no murmurs or thrills  ABDOMEN: Soft, no hepatosplenomegaly, no evidence of " ascites  EXTREMITES: No cyanosis, clubbing or edema   NEUROLOGICAL SYSTEM: Oriented x 3, no gross neurological defects, gait normal    Medical records and labs reviewed in [facility] reviewed    Assessment and plan:  · Sleep apnea unspecified, (G47.30) Patient's symptoms and examination is consistent with sleep apnea.  I have talked to the patient about the signs and symptoms of sleep apnea and consequences of untreated sleep apnea. Discussed sleep testing.  I have placed a order in epic for a home sleep test.  Patient will have a follow-up after this sleep test is done.   · Obesity, patient's BMI is Body mass index is 30.56 kg/m².. I have discussed the relationship between weight and sleep apnea. Weight reduction is encouraged.  I will also discussed with the patient diet and exercise.  · Snoring secondary to sleep apnea  · Hypersomnia with [place] Sleepiness Scale of Total score: 6 due to sleep apnea.        I once again thank you for asking me to see this patient in consultation and I have forwarded my opinion and treatment plan.  Please do not hesitate to call me if you have any questions.     Roberto Jennings MD, [facility]  Sleep Medicine.(Board-certified)  [facility]  10/31/2019 ,

## 2019-12-05 ENCOUNTER — HOSPITAL ENCOUNTER (OUTPATIENT)
Dept: SLEEP MEDICINE | Facility: HOSPITAL | Age: 49
Discharge: HOME | End: 2019-12-05
Admitting: INTERNAL MEDICINE

## 2019-12-05 DIAGNOSIS — R06.83 SNORING: ICD-10-CM

## 2019-12-05 DIAGNOSIS — G47.30 OBSERVED SLEEP APNEA: ICD-10-CM

## 2019-12-05 DIAGNOSIS — I10 ESSENTIAL HYPERTENSION: ICD-10-CM

## 2019-12-05 PROCEDURE — 95806 SLEEP STUDY UNATT&RESP EFFT: CPT

## 2019-12-05 PROCEDURE — 95806 SLEEP STUDY UNATT&RESP EFFT: CPT | Performed by: INTERNAL MEDICINE

## 2019-12-16 ENCOUNTER — TELEPHONE (OUTPATIENT)
Dept: SLEEP MEDICINE | Facility: HOSPITAL | Age: 49
End: 2019-12-16

## 2019-12-16 NOTE — TELEPHONE ENCOUNTER
Lm for pt to cb to go over results. AHI was 7.3/hr & O2 was 85% at the lowest. Pt will need to schedule f/u compliance appt. Faxing to [facility] in [place].

## 2019-12-17 ENCOUNTER — TELEPHONE (OUTPATIENT)
Dept: SLEEP MEDICINE | Facility: HOSPITAL | Age: 49
End: 2019-12-17

## 2019-12-17 NOTE — TELEPHONE ENCOUNTER
Pt called and wants his DME to go to [employer] fax [phone]. Tech will refax his info to [employer]

## 2020-02-13 ENCOUNTER — OFFICE VISIT (OUTPATIENT)
Dept: SLEEP MEDICINE | Facility: HOSPITAL | Age: 50
End: 2020-02-13

## 2020-02-13 VITALS
BODY MASS INDEX: 31.95 KG/M2 | DIASTOLIC BLOOD PRESSURE: 81 MMHG | HEART RATE: 70 BPM | HEIGHT: 74 IN | SYSTOLIC BLOOD PRESSURE: 124 MMHG | OXYGEN SATURATION: 98 % | WEIGHT: 249 LBS

## 2020-02-13 DIAGNOSIS — Z99.89 OSA ON CPAP: Primary | ICD-10-CM

## 2020-02-13 DIAGNOSIS — G47.33 OSA ON CPAP: Primary | ICD-10-CM

## 2020-02-13 PROBLEM — R06.83 SNORING: Status: RESOLVED | Noted: 2019-10-31 | Resolved: 2020-02-13

## 2020-02-13 PROCEDURE — G0463 HOSPITAL OUTPT CLINIC VISIT: HCPCS

## 2020-02-13 PROCEDURE — 99213 OFFICE O/P EST LOW 20 MIN: CPT | Performed by: INTERNAL MEDICINE

## 2020-02-13 NOTE — PROGRESS NOTES
[facility]  [address] [address]  Phone   Fax       SLEEP CLINIC FOLLOW UP PROGRESS NOTE.    Adan Chauhan  1970  49 y.o.  male      PCP: Isaac Ruelas MD      Date of visit: 2/13/2020    Chief Complaint   Patient presents with   • Sleep Apnea   • Follow-up       INTERM HISTORY:  This is a 49 y.o. years old patient who has a history of sleep apnea and is on CPAP.  Patient reports good compliance with the device.  Patient has mild sleep apnea with AHI of 7.5 but on the left side it was 13/h.  He is using a auto CPAP.  He bought the [email or web address].  We were unable to read the smartcard but I was able to look at the display on the CPAP.    Sleep schedule  Normally goes to bed at 11 PM  Wakes up at 6 AM  Feels refreshed after waking up: Yes      The Smart card downloaded on 2/13/2020 has been reviewed by me and shows the following..  Compliance; 100 %  > 4 hr use, 97 %  Average use of the device 7.3 hours per night  Residual AHI: 5 /hr (normal less than 5)  Mask type: Nasal mask      Past Medical History:   Diagnosis Date   • Chest pain    • Hypertension    • Lightheadedness    • MVP (mitral valve prolapse)    • Palpitations    • Sinus tachycardia    • Visual disturbances        MEDICATIONS: reviewed by me    Current Outpatient Medications:   •  amLODIPine (NORVASC) 10 MG tablet, Take 1 tablet by mouth Daily., Disp: 90 tablet, Rfl: 2  •  aspirin 81 MG tablet, Take 81 mg by mouth Daily., Disp: , Rfl:   •  Calcium Carbonate-Vit D-Min (CALCIUM 1200 PO), Take  by mouth Daily., Disp: , Rfl:   •  cetirizine (zyrTEC) 10 MG tablet, Take 10 mg by mouth Daily., Disp: , Rfl:   •  Multiple Vitamins-Minerals (MULTI COMPLETE PO), Take  by mouth Daily., Disp: , Rfl:     No Known Allergies reviewed by me    SOCIAL, FAMILY HISTORY: Medical records are reviewed and noted by me.    REVIEW OF SYSTEMS:   [place] Sleepiness Scale :Total score: 4  "  Snoring: no  Morning headache: No  Nasal congestion: No  Leg movements: No  Heart burn no    PHYSICAL EXAMINATION:  Vitals:    02/13/20 0933   BP: 124/81   Pulse: 70   SpO2: 98%   Weight: 113 kg (249 lb)   Height: 188 cm (74\")    Body mass index is 31.97 kg/m².    HEENT: pupils are round equal and reacting to light and accommodation, nasal passage is clear, no nasal polyps, no lymphadenopathy, throat is clear, oral airway Mallampati class 3  RESPRATORY SYSTEM: Breath sounds are equal on both sides and are normal, no wheezes or crackles  CARDIOVASULAR SYSTEM: Heart rate is regular without murmur  ABDOMEN: Soft, no ascites, no hepatosplenomegaly.  EXTREMITIES: No cyanosis, clubbing or edema       ASSESSMENT AND PLAN:  · Obstructive sleep apnea, patient is using the CPAP with good compliance for treatment of sleep apnea.  I have reviewed the smart card down load and discussed with the patient the download data and encouarged the patient to continue to use the CPAP.  The symptoms have improved and the residual AHI is acceptable.  The device is benefiting the patient and the device is medically necessary.  Patient is planning to buy supplies online as it is cheaper and is in a transition at present does not have any insurance.    · Obesity, patient's BMI is Body mass index is 31.97 kg/m²..  I have discussed weight reduction and the health benefits.  I have also discuss the relationship between the weight and sleep apnea and encouraged the patient to lose weight which is beneficial in treating sleep apnea. Discussed diet and exercise with the patient.        Roberto Jennings MD, [facility]  Sleep Medicine.(Board-certified)  [facility]  Medical Director for [facility]   2/13/2020               "

## 2020-09-28 DIAGNOSIS — I10 ESSENTIAL HYPERTENSION: Primary | ICD-10-CM

## 2020-09-29 RX ORDER — AMLODIPINE BESYLATE 10 MG/1
10 TABLET ORAL DAILY
Qty: 90 TABLET | Refills: 0 | Status: SHIPPED | OUTPATIENT
Start: 2020-09-29 | End: 2020-12-18 | Stop reason: SDUPTHER

## 2020-10-19 ENCOUNTER — TELEPHONE (OUTPATIENT)
Dept: INTERNAL MEDICINE | Age: 50
End: 2020-10-19

## 2020-10-19 ENCOUNTER — OFFICE VISIT (OUTPATIENT)
Dept: INTERNAL MEDICINE | Age: 50
End: 2020-10-19

## 2020-10-19 VITALS
WEIGHT: 240.6 LBS | SYSTOLIC BLOOD PRESSURE: 130 MMHG | DIASTOLIC BLOOD PRESSURE: 68 MMHG | BODY MASS INDEX: 30.88 KG/M2 | RESPIRATION RATE: 13 BRPM | HEIGHT: 74 IN | TEMPERATURE: 97.3 F | HEART RATE: 80 BPM | OXYGEN SATURATION: 100 %

## 2020-10-19 DIAGNOSIS — I10 ESSENTIAL HYPERTENSION: Primary | ICD-10-CM

## 2020-10-19 DIAGNOSIS — R73.9 ELEVATED BLOOD SUGAR: ICD-10-CM

## 2020-10-19 DIAGNOSIS — Z00.00 HEALTHCARE MAINTENANCE: ICD-10-CM

## 2020-10-19 DIAGNOSIS — R97.20 ELEVATED PSA: ICD-10-CM

## 2020-10-19 PROCEDURE — 99214 OFFICE O/P EST MOD 30 MIN: CPT | Performed by: INTERNAL MEDICINE

## 2020-10-19 RX ORDER — CETIRIZINE HYDROCHLORIDE 10 MG/1
10 TABLET ORAL DAILY
Qty: 90 TABLET | Refills: 2 | Status: SHIPPED | OUTPATIENT
Start: 2020-10-19 | End: 2021-02-25

## 2020-10-19 RX ORDER — ASCORBIC ACID 500 MG
500 TABLET ORAL DAILY
COMMUNITY
End: 2021-02-25

## 2020-10-19 NOTE — PROGRESS NOTES
"  Adan Chauhan is a 50 y.o. male who presents with   Chief Complaint   Patient presents with   • Hypertension     Amlodipine 10 mg daily; blood pressure at home 120-130/80-90.  Wife (nurse) checks it for him he says.   • Blood Sugar Problem     Blood sugar 101-October 2019.  Weight 240 now versus 249 in February.  No prescription treatment.   • PSA 3.3     October 2019   .    50-year-old male.  Last seen October 2019 for his annual physical.  Presents for a general checkup with lab updates.  He is not particularly interested in getting a physical this time around but will defer it until 2021.    Hypertension  This is a chronic problem. The current episode started more than 1 year ago. The problem has been waxing and waning since onset. The problem is controlled. Current antihypertension treatment includes calcium channel blockers. There are no compliance problems.    Blood Sugar Problem  This is a chronic problem. The current episode started more than 1 year ago. The problem has been waxing and waning. Treatments tried: Weight reduction.        /68   Pulse 80   Temp 97.3 °F (36.3 °C)   Resp 13   Ht 188 cm (74.02\")   Wt 109 kg (240 lb 9.6 oz)   SpO2 100%   BMI 30.88 kg/m²     The following portions of the patient's history were reviewed and updated as appropriate: allergies, current medications, past medical history, past social history, past surgical history and problem list.    Review of Systems   Constitutional: Negative.    HENT: Negative.    Eyes: Negative.    Respiratory: Negative.    Cardiovascular: Negative.    Genitourinary: Negative.    Musculoskeletal: Negative.    Skin: Negative.    Neurological: Negative.    Psychiatric/Behavioral: Negative.        Objective   Physical Exam  Vitals signs and nursing note reviewed.   Constitutional:       General: He is not in acute distress.     Appearance: He is well-developed. He is not diaphoretic.   HENT:      Head: Normocephalic and atraumatic. "   Eyes:      Pupils: Pupils are equal, round, and reactive to light.   Neck:      Musculoskeletal: Normal range of motion and neck supple.      Thyroid: No thyromegaly.      Comments: Neck exam negative.  Carotid auscultation normal-no bruits heard.    Cardiovascular:      Rate and Rhythm: Normal rate and regular rhythm.      Heart sounds: Normal heart sounds. No murmur. No friction rub. No gallop.    Pulmonary:      Effort: Pulmonary effort is normal. No respiratory distress.      Breath sounds: Normal breath sounds. No wheezing or rales.   Chest:      Chest wall: No tenderness.   Genitourinary:     Comments: PSA 3.3 in October 2019; 3.2 the previous year in 2018  Skin:     General: Skin is warm and dry.      Coloration: Skin is not pale.      Findings: No erythema.   Psychiatric:         Behavior: Behavior normal.         Thought Content: Thought content normal.         Judgment: Judgment normal.         Assessment/Plan   Diagnoses and all orders for this visit:    1. Essential hypertension (Primary)  -     Comprehensive Metabolic Panel  -     Lipid Panel    2. Elevated PSA  -     PSA DIAGNOSTIC    3. Elevated blood sugar  -     Comprehensive Metabolic Panel  -     Lipid Panel  -     Hemoglobin A1c    4. Healthcare maintenance  -     Hepatitis C Antibody      Plan: Labs as above for the physical issues as outlined.  Assuming today's labs are acceptable-annual physical with comprehensive lab updates advised for 1 year.    Continue all current treatment as prescribed for now assuming today's labs are acceptable.

## 2020-10-20 LAB
ALBUMIN SERPL-MCNC: 4.3 G/DL (ref 4–5)
ALBUMIN/GLOB SERPL: 1.7 {RATIO} (ref 1.2–2.2)
ALP SERPL-CCNC: 73 IU/L (ref 39–117)
ALT SERPL-CCNC: 22 IU/L (ref 0–44)
AST SERPL-CCNC: 18 IU/L (ref 0–40)
BILIRUB SERPL-MCNC: 0.4 MG/DL (ref 0–1.2)
BUN SERPL-MCNC: 18 MG/DL (ref 6–24)
BUN/CREAT SERPL: 17 (ref 9–20)
CALCIUM SERPL-MCNC: 10.3 MG/DL (ref 8.7–10.2)
CHLORIDE SERPL-SCNC: 103 MMOL/L (ref 96–106)
CHOLEST SERPL-MCNC: 202 MG/DL (ref 100–199)
CO2 SERPL-SCNC: 25 MMOL/L (ref 20–29)
CREAT SERPL-MCNC: 1.06 MG/DL (ref 0.76–1.27)
GLOBULIN SER CALC-MCNC: 2.6 G/DL (ref 1.5–4.5)
GLUCOSE SERPL-MCNC: 94 MG/DL (ref 65–99)
HBA1C MFR BLD: 5.4 % (ref 4.8–5.6)
HCV AB S/CO SERPL IA: <0.1 S/CO RATIO (ref 0–0.9)
HDLC SERPL-MCNC: 45 MG/DL
LDLC SERPL CALC-MCNC: 132 MG/DL (ref 0–99)
POTASSIUM SERPL-SCNC: 4.2 MMOL/L (ref 3.5–5.2)
PROT SERPL-MCNC: 6.9 G/DL (ref 6–8.5)
PSA SERPL-MCNC: 3.2 NG/ML (ref 0–4)
SODIUM SERPL-SCNC: 140 MMOL/L (ref 134–144)
TRIGL SERPL-MCNC: 137 MG/DL (ref 0–149)
VLDLC SERPL CALC-MCNC: 25 MG/DL (ref 5–40)

## 2020-12-18 DIAGNOSIS — I10 ESSENTIAL HYPERTENSION: ICD-10-CM

## 2020-12-18 RX ORDER — AMLODIPINE BESYLATE 10 MG/1
10 TABLET ORAL DAILY
Qty: 90 TABLET | Refills: 0 | Status: SHIPPED | OUTPATIENT
Start: 2020-12-18 | End: 2021-02-25

## 2020-12-18 NOTE — TELEPHONE ENCOUNTER
Caller: Adan Chauhan    Relationship: Self    Best call back number: [phone]   Medication needed:   Requested Prescriptions     Pending Prescriptions Disp Refills   • amLODIPine (NORVASC) 10 MG tablet 90 tablet 0     Sig: Take 1 tablet by mouth Daily.       When do you need the refill by: ASAP   What details did the patient provide when requesting the medication: ASAP   Does the patient have less than a 3 day supply:  [x] Yes  [] No    What is the patient's preferred pharmacy: [facility] [address] [facility] - [phone]  - [phone]

## 2021-02-12 ENCOUNTER — OFFICE VISIT (OUTPATIENT)
Dept: SLEEP MEDICINE | Facility: HOSPITAL | Age: 51
End: 2021-02-12

## 2021-02-12 VITALS
WEIGHT: 248 LBS | HEART RATE: 75 BPM | SYSTOLIC BLOOD PRESSURE: 139 MMHG | BODY MASS INDEX: 31.83 KG/M2 | HEIGHT: 74 IN | DIASTOLIC BLOOD PRESSURE: 85 MMHG

## 2021-02-12 DIAGNOSIS — E66.09 CLASS 1 OBESITY DUE TO EXCESS CALORIES WITHOUT SERIOUS COMORBIDITY WITH BODY MASS INDEX (BMI) OF 31.0 TO 31.9 IN ADULT: ICD-10-CM

## 2021-02-12 DIAGNOSIS — G47.33 OSA ON CPAP: Primary | ICD-10-CM

## 2021-02-12 DIAGNOSIS — Z99.89 OSA ON CPAP: Primary | ICD-10-CM

## 2021-02-12 PROBLEM — E66.811 CLASS 1 OBESITY DUE TO EXCESS CALORIES WITHOUT SERIOUS COMORBIDITY WITH BODY MASS INDEX (BMI) OF 31.0 TO 31.9 IN ADULT: Status: ACTIVE | Noted: 2021-02-12

## 2021-02-12 PROCEDURE — G0463 HOSPITAL OUTPT CLINIC VISIT: HCPCS

## 2021-02-12 PROCEDURE — 99213 OFFICE O/P EST LOW 20 MIN: CPT | Performed by: INTERNAL MEDICINE

## 2021-02-12 NOTE — PROGRESS NOTES
[facility]  [address] [place] [address]  Phone   Fax       SLEEP CLINIC FOLLOW UP PROGRESS NOTE.    Adan JANIS Tien  1970  50 y.o.  male      PCP: Isaac Ruelas MD      Date of visit: 2/12/2021    Chief Complaint   Patient presents with   • Sleep Apnea       INTERM HISTORY:  This is a 50 y.o. years old patient who has a history of sleep apnea and is on CPAP.   The patient was previously seen on February 13 2020, the note was reviewed.  The diagnostic studies which were done on December 5, 2019 with AHI of 7.3/h and on the left side 16.1/h yes, were also reviewed with the patient.    Sleep schedule  Normally goes to bed at 11 PM  Wakes up at 7 AM  Number of times you wake up once asleep: 1  Do you feel refreshed after waking up ?  Yes    The Smart card downloaded on 2/12/2021 has been reviewed by me for compliance and discussed the data with the patient  Compliance; 100% use   > 4 hr use, 100 %  Average use of the device 7.8 hours per night  Residual AHI: 4.5 /hr (normal less than 5)  Mask type: Full facemask  DME: [employer]  CPAP +10 cm      Past Medical History:   Diagnosis Date   • Chest pain    • Hypertension    • Lightheadedness    • MVP (mitral valve prolapse)    • Palpitations    • Sinus tachycardia    • Visual disturbances        MEDICATIONS: reviewed by me    Current Outpatient Medications:   •  amLODIPine (NORVASC) 10 MG tablet, Take 1 tablet by mouth Daily., Disp: 90 tablet, Rfl: 0  •  aspirin 81 MG tablet, Take 81 mg by mouth Daily., Disp: , Rfl:   •  Calcium Carbonate-Vit D-Min (CALCIUM 1200 PO), Take  by mouth Daily., Disp: , Rfl:   •  cetirizine (zyrTEC) 10 MG tablet, Take 1 tablet by mouth Daily., Disp: 90 tablet, Rfl: 2  •  Multiple Vitamins-Minerals (MULTI COMPLETE PO), Take  by mouth Daily., Disp: , Rfl:   •  vitamin C (ASCORBIC ACID) 500 MG tablet, Take 500 mg by mouth Daily., Disp: , Rfl:     No Known Allergies reviewed  "by me    SOCIAL, FAMILY HISTORY: Medical records are reviewed and noted by me.  History of smoking no  History of alcohol use no  Caffeine use 2    REVIEW OF SYSTEMS:   [place] Sleepiness Scale :Total score: 3   Snoring: Resolved  Morning headache: No  Nasal congestion: None  Leg movements: No  Heart burn none    PHYSICAL EXAMINATION:  Vitals:    02/12/21 0700   BP: 139/85   Pulse: 75   Weight: 112 kg (248 lb)   Height: 188 cm (74\")    Body mass index is 31.84 kg/m².    HEENT: pupils are round equal and reacting to light and accommodation, nasal passage is clear, no nasal polyps, no lymphadenopathy, throat is clear, oral airway Mallampati class 3  RESPRATORY SYSTEM: Breath sounds are equal on both sides and are normal, no wheezes or crackles  CARDIOVASULAR SYSTEM: Heart rate is regular without murmur  ABDOMEN: Soft, no ascites, no hepatosplenomegaly.  EXTREMITIES: No cyanosis, clubbing or edema       ASSESSMENT AND PLAN:  · Obstructive sleep apnea, patient is using the device with good compliance for treatment of sleep apnea.  I have reviewed the smart card down load and discussed with the patient the download data and encouarged the patient to continue to use the device.  The symptoms have improved and the residual AHI is acceptable.  The device is benefiting the patient and the device is medically necessary. The patient is also instructed to get the supplies from the [employer] and a prescription for supplies has been sent to the [employer].  I have also discussed the good sleep hygiene habits and adequate amount of sleep needed.  · Obesity, patient's BMI is Body mass index is 31.84 kg/m²..  I have discussed weight reduction and the health benefits.  I have also discuss the relationship between the weight and sleep apnea and encouraged the patient to lose weight which is beneficial in treating sleep apnea. Discussed diet and exercise with the patient.  · Return to clinic in 12 months for follow-up and patient " also instructed to call the sleep clinic for any problems.  All the patient's questions were answered.        Roberto Jennings MD  Sleep Medicine  Medical Director for [facility]  2/12/2021

## 2021-02-25 ENCOUNTER — OFFICE VISIT (OUTPATIENT)
Dept: FAMILY MEDICINE CLINIC | Facility: CLINIC | Age: 51
End: 2021-02-25

## 2021-02-25 VITALS
SYSTOLIC BLOOD PRESSURE: 104 MMHG | TEMPERATURE: 98.4 F | BODY MASS INDEX: 31.57 KG/M2 | WEIGHT: 246 LBS | OXYGEN SATURATION: 99 % | HEART RATE: 68 BPM | HEIGHT: 74 IN | DIASTOLIC BLOOD PRESSURE: 70 MMHG

## 2021-02-25 DIAGNOSIS — E66.09 CLASS 1 OBESITY DUE TO EXCESS CALORIES WITHOUT SERIOUS COMORBIDITY WITH BODY MASS INDEX (BMI) OF 31.0 TO 31.9 IN ADULT: ICD-10-CM

## 2021-02-25 DIAGNOSIS — I10 ESSENTIAL HYPERTENSION: Primary | ICD-10-CM

## 2021-02-25 DIAGNOSIS — J30.89 NON-SEASONAL ALLERGIC RHINITIS, UNSPECIFIED TRIGGER: ICD-10-CM

## 2021-02-25 DIAGNOSIS — E78.00 HYPERCHOLESTEROLEMIA: ICD-10-CM

## 2021-02-25 PROCEDURE — 99214 OFFICE O/P EST MOD 30 MIN: CPT | Performed by: PHYSICIAN ASSISTANT

## 2021-02-25 RX ORDER — CETIRIZINE HYDROCHLORIDE 10 MG/1
10 TABLET ORAL DAILY
Qty: 90 TABLET | Refills: 2 | Status: SHIPPED | OUTPATIENT
Start: 2021-02-25 | End: 2022-05-25

## 2021-02-25 RX ORDER — AMLODIPINE BESYLATE 10 MG/1
10 TABLET ORAL DAILY
Qty: 90 TABLET | Refills: 2 | Status: SHIPPED | OUTPATIENT
Start: 2021-02-25 | End: 2021-07-22

## 2021-02-25 NOTE — PROGRESS NOTES
"Chief Complaint  Hypertension and Establish Care    Subjective          Adan Chauhan presents to [facility] MEDICAL [facility] PRIMARY CARE  History of Present Illness    Adan is a 50-year old male who presents to establish as new patient office visit today.  His provider from [facility] has retired.  I have reviewed his health history with him today.  He is here for hypertension management.  Have fasting lab work performed by prior PCP in October 2020.  Adan states overall he is feeling well at office visit today.  Has a history of a \"floppy mitral valve\".  Has had cardiac work-up in the past and was cleared.  Last echocardiogram was a few years ago which was normal.  Diet has been slightly healthy.  Sleep has been normal with his CPAP machine.  Bowel movements have been daily without dark black tarry stools.  Recently seen ENT, Dr. Pedersen, for vertigo.  This has since resolved.  Has been compliant with blood pressure medications.  He does check this at home occasionally.  Has not checked it recently.  Caffeine intake is approximately 16 ounces daily.  May drink a Coke during the day as well.  He notices that he may get an occasional esophageal spasm when he eats meat or eats too fast.  States this comes and goes and is not quite an issue.  Has not seen GI.  Not any difficulty swallowing.  Skin denied any fevers, chills, upper respiratory symptoms, chest pain, shortness of air, wheezing, dizziness, headache, abdominal pain, or swelling of feet.       Objective   Vital Signs:   /70 (BP Location: Left arm, Patient Position: Sitting, Cuff Size: Adult)   Pulse 68   Temp 98.4 °F (36.9 °C)   Ht 188 cm (74\")   Wt 112 kg (246 lb)   SpO2 99%   BMI 31.58 kg/m²     Physical Exam  Vitals signs and nursing note reviewed.   Constitutional:       Appearance: Normal appearance. He is well-developed and well-groomed. He is obese.      Interventions: Face mask in place.   HENT:      Head: Normocephalic and " atraumatic.      Jaw: There is normal jaw occlusion.      Right Ear: Hearing, tympanic membrane, ear canal and external ear normal.      Left Ear: Hearing, tympanic membrane, ear canal and external ear normal.      Nose: Nose normal.      Mouth/Throat:      Lips: Pink.      Mouth: Mucous membranes are moist.      Dentition: Normal dentition.      Tongue: No lesions.      Pharynx: Oropharynx is clear.      Tonsils: No tonsillar exudate.   Eyes:      General: Lids are normal.      Conjunctiva/sclera: Conjunctivae normal.      Pupils: Pupils are equal, round, and reactive to light.   Neck:      Musculoskeletal: Neck supple.      Thyroid: No thyroid mass, thyromegaly or thyroid tenderness.      Vascular: No carotid bruit.      Trachea: Trachea and phonation normal. No tracheal tenderness.   Cardiovascular:      Rate and Rhythm: Normal rate and regular rhythm.      Pulses: Normal pulses.      Heart sounds: Normal heart sounds, S1 normal and S2 normal. No murmur.   Pulmonary:      Effort: Pulmonary effort is normal.      Breath sounds: Normal breath sounds and air entry.   Abdominal:      General: Bowel sounds are normal.      Palpations: Abdomen is soft.      Tenderness: There is no abdominal tenderness.   Musculoskeletal:      Right lower leg: No edema.      Left lower leg: No edema.   Lymphadenopathy:      Cervical: No cervical adenopathy.      Right cervical: No superficial, deep or posterior cervical adenopathy.     Left cervical: No superficial, deep or posterior cervical adenopathy.   Skin:     General: Skin is warm and dry.      Capillary Refill: Capillary refill takes less than 2 seconds.   Neurological:      Mental Status: He is alert and oriented to person, place, and time.   Psychiatric:         Attention and Perception: Attention and perception normal.         Mood and Affect: Mood and affect normal.         Speech: Speech normal.         Behavior: Behavior is cooperative.         Thought Content: Thought  content normal.         Cognition and Memory: Cognition and memory normal.         Judgment: Judgment normal.     I was wearing a surgical mask and face shield during the entire office visit/encounter.    Patient's (Body mass index is 31.58 kg/m².) indicates that they are obese (BMI >30) with obesity-related health conditions that include hypertension . Obesity is improving with lifestyle modifications. BMI is is above average; BMI management plan is completed. We discussed low calorie, low carb based diet program, portion control, increasing exercise and joining a fitness center or start home based exercise program.      Result Review :     Common labs    Common Labsle 10/19/20 10/19/20 10/19/20 10/19/20    1130 1130 1130 1130   Glucose 94      BUN 18      Creatinine 1.06      eGFR Non  Am 82      eGFR African Am 95      Sodium 140      Potassium 4.2      Chloride 103      Calcium 10.3 (A)      Total Protein 6.9      Albumin 4.3      Total Bilirubin 0.4      Alkaline Phosphatase 73      AST (SGOT) 18      ALT (SGPT) 22      Total Cholesterol  202 (A)     Triglycerides  137     HDL Cholesterol  45     LDL Cholesterol   132 (A)     Hemoglobin A1C    5.4   PSA   3.2    (A) Abnormal value       Comments are available for some flowsheets but are not being displayed.                     Assessment and Plan    Diagnoses and all orders for this visit:    1. Essential hypertension (Primary)  -     amLODIPine (NORVASC) 10 MG tablet; Take 1 tablet by mouth Daily.  Dispense: 90 tablet; Refill: 2    2. Class 1 obesity due to excess calories without serious comorbidity with body mass index (BMI) of 31.0 to 31.9 in adult    3. Non-seasonal allergic rhinitis, unspecified trigger  -     cetirizine (zyrTEC) 10 MG tablet; Take 1 tablet by mouth Daily.  Dispense: 90 tablet; Refill: 2    4. Hypercholesterolemia    1.  New hypertension: I reviewed his health history form with him at office visit today.  I have rechecked blood  pressure and got 104/70 in left arm.  Have refilled his amlodipine to pharmacy.  Will return to office in October for annual physical examination with fasting lab work.  2.  New hypercholesterolemia: I have reviewed his above blood work from October 2020 from his previous PCP.  Cholesterol readings were slightly elevated.  Stressed the importance of eating healthier and increasing physical activity.  Will reevaluate his lab work in October 2021.  3.  Chronic and stable allergic rhinitis: I have sent a refill of Zyrtec to pharmacy.  Will use as directed.  4.  New obesity: Have stressed the importance of decreasing fatty food and carbohydrates in diet.  Also will increase physical activity for weight loss.      Follow Up   Return in about 8 months (around 10/25/2021) for Annual.  Patient was given instructions and counseling regarding his condition or for health maintenance advice. Please see specific information pulled into the AVS if appropriate.         [facility] FAMILY [facility]ICINE  [address] [place] [id number]  Dept: [phone]  Dept Fax: [phone]  Loc: [phone]  Loc Fax: [phone]

## 2021-03-01 DIAGNOSIS — K21.01 GASTROESOPHAGEAL REFLUX DISEASE WITH ESOPHAGITIS AND HEMORRHAGE: Primary | ICD-10-CM

## 2021-03-01 DIAGNOSIS — K21.00 GASTROESOPHAGEAL REFLUX DISEASE WITH ESOPHAGITIS WITHOUT HEMORRHAGE: ICD-10-CM

## 2021-03-01 RX ORDER — FAMOTIDINE 10 MG
10 TABLET ORAL 2 TIMES DAILY
Qty: 60 TABLET | Refills: 6 | Status: SHIPPED | OUTPATIENT
Start: 2021-03-01 | End: 2021-10-21

## 2021-03-31 ENCOUNTER — APPOINTMENT (OUTPATIENT)
Dept: VACCINE CLINIC | Facility: HOSPITAL | Age: 51
End: 2021-03-31

## 2021-07-22 DIAGNOSIS — I10 ESSENTIAL HYPERTENSION: ICD-10-CM

## 2021-07-22 RX ORDER — AMLODIPINE BESYLATE 10 MG/1
TABLET ORAL
Qty: 90 TABLET | Refills: 1 | Status: SHIPPED | OUTPATIENT
Start: 2021-07-22 | End: 2021-10-21

## 2021-10-05 DIAGNOSIS — I10 ESSENTIAL HYPERTENSION: Primary | ICD-10-CM

## 2021-10-05 DIAGNOSIS — E78.00 HYPERCHOLESTEROLEMIA: ICD-10-CM

## 2021-10-05 DIAGNOSIS — Z00.00 ROUTINE GENERAL MEDICAL EXAMINATION AT A HEALTH CARE FACILITY: ICD-10-CM

## 2021-10-05 DIAGNOSIS — Z12.5 SCREENING PSA (PROSTATE SPECIFIC ANTIGEN): ICD-10-CM

## 2021-10-14 LAB
ALBUMIN SERPL-MCNC: 4.4 G/DL (ref 3.5–5.2)
ALBUMIN/GLOB SERPL: 2.2 G/DL
ALP SERPL-CCNC: 64 U/L (ref 39–117)
ALT SERPL-CCNC: 16 U/L (ref 1–41)
AST SERPL-CCNC: 15 U/L (ref 1–40)
BASOPHILS # BLD AUTO: 0.03 10*3/MM3 (ref 0–0.2)
BASOPHILS NFR BLD AUTO: 0.4 % (ref 0–1.5)
BILIRUB SERPL-MCNC: 0.4 MG/DL (ref 0–1.2)
BUN SERPL-MCNC: 15 MG/DL (ref 6–20)
BUN/CREAT SERPL: 14 (ref 7–25)
CALCIUM SERPL-MCNC: 10.3 MG/DL (ref 8.6–10.5)
CHLORIDE SERPL-SCNC: 105 MMOL/L (ref 98–107)
CHOLEST SERPL-MCNC: 172 MG/DL (ref 0–200)
CO2 SERPL-SCNC: 28.6 MMOL/L (ref 22–29)
CREAT SERPL-MCNC: 1.07 MG/DL (ref 0.76–1.27)
EOSINOPHIL # BLD AUTO: 0.18 10*3/MM3 (ref 0–0.4)
EOSINOPHIL NFR BLD AUTO: 2.7 % (ref 0.3–6.2)
ERYTHROCYTE [DISTWIDTH] IN BLOOD BY AUTOMATED COUNT: 13.2 % (ref 12.3–15.4)
GLOBULIN SER CALC-MCNC: 2 GM/DL
GLUCOSE SERPL-MCNC: 86 MG/DL (ref 65–99)
HCT VFR BLD AUTO: 48.5 % (ref 37.5–51)
HDLC SERPL-MCNC: 48 MG/DL (ref 40–60)
HGB BLD-MCNC: 15.8 G/DL (ref 13–17.7)
IMM GRANULOCYTES # BLD AUTO: 0 10*3/MM3 (ref 0–0.05)
IMM GRANULOCYTES NFR BLD AUTO: 0 % (ref 0–0.5)
LDLC SERPL CALC-MCNC: 106 MG/DL (ref 0–100)
LDLC/HDLC SERPL: 2.17 {RATIO}
LYMPHOCYTES # BLD AUTO: 1.88 10*3/MM3 (ref 0.7–3.1)
LYMPHOCYTES NFR BLD AUTO: 28.2 % (ref 19.6–45.3)
MCH RBC QN AUTO: 30.9 PG (ref 26.6–33)
MCHC RBC AUTO-ENTMCNC: 32.6 G/DL (ref 31.5–35.7)
MCV RBC AUTO: 94.7 FL (ref 79–97)
MONOCYTES # BLD AUTO: 0.63 10*3/MM3 (ref 0.1–0.9)
MONOCYTES NFR BLD AUTO: 9.4 % (ref 5–12)
NEUTROPHILS # BLD AUTO: 3.95 10*3/MM3 (ref 1.7–7)
NEUTROPHILS NFR BLD AUTO: 59.3 % (ref 42.7–76)
NRBC BLD AUTO-RTO: 0 /100 WBC (ref 0–0.2)
PLATELET # BLD AUTO: 267 10*3/MM3 (ref 140–450)
POTASSIUM SERPL-SCNC: 4.2 MMOL/L (ref 3.5–5.2)
PROT SERPL-MCNC: 6.4 G/DL (ref 6–8.5)
PSA SERPL-MCNC: 2.77 NG/ML (ref 0–4)
RBC # BLD AUTO: 5.12 10*6/MM3 (ref 4.14–5.8)
SODIUM SERPL-SCNC: 140 MMOL/L (ref 136–145)
TRIGL SERPL-MCNC: 99 MG/DL (ref 0–150)
VLDLC SERPL CALC-MCNC: 18 MG/DL (ref 5–40)
WBC # BLD AUTO: 6.67 10*3/MM3 (ref 3.4–10.8)

## 2021-10-21 ENCOUNTER — OFFICE VISIT (OUTPATIENT)
Dept: FAMILY MEDICINE CLINIC | Facility: CLINIC | Age: 51
End: 2021-10-21

## 2021-10-21 ENCOUNTER — HOSPITAL ENCOUNTER (OUTPATIENT)
Dept: GENERAL RADIOLOGY | Facility: HOSPITAL | Age: 51
Discharge: HOME | End: 2021-10-21
Admitting: PHYSICIAN ASSISTANT

## 2021-10-21 VITALS
BODY MASS INDEX: 27.72 KG/M2 | DIASTOLIC BLOOD PRESSURE: 74 MMHG | HEART RATE: 64 BPM | OXYGEN SATURATION: 96 % | WEIGHT: 216 LBS | RESPIRATION RATE: 13 BRPM | HEIGHT: 74 IN | SYSTOLIC BLOOD PRESSURE: 126 MMHG | TEMPERATURE: 98 F

## 2021-10-21 DIAGNOSIS — M25.512 CHRONIC LEFT SHOULDER PAIN: ICD-10-CM

## 2021-10-21 DIAGNOSIS — G89.29 CHRONIC LEFT SHOULDER PAIN: ICD-10-CM

## 2021-10-21 DIAGNOSIS — M25.512 CHRONIC LEFT SHOULDER PAIN: Primary | ICD-10-CM

## 2021-10-21 DIAGNOSIS — G89.29 CHRONIC LEFT SHOULDER PAIN: Primary | ICD-10-CM

## 2021-10-21 DIAGNOSIS — Z23 NEED FOR INFLUENZA VACCINATION: ICD-10-CM

## 2021-10-21 DIAGNOSIS — Z00.00 ANNUAL PHYSICAL EXAM: Primary | ICD-10-CM

## 2021-10-21 DIAGNOSIS — I10 ESSENTIAL HYPERTENSION: ICD-10-CM

## 2021-10-21 PROCEDURE — 99396 PREV VISIT EST AGE 40-64: CPT | Performed by: PHYSICIAN ASSISTANT

## 2021-10-21 PROCEDURE — 90471 IMMUNIZATION ADMIN: CPT | Performed by: PHYSICIAN ASSISTANT

## 2021-10-21 PROCEDURE — 73030 X-RAY EXAM OF SHOULDER: CPT

## 2021-10-21 PROCEDURE — 90686 IIV4 VACC NO PRSV 0.5 ML IM: CPT | Performed by: PHYSICIAN ASSISTANT

## 2021-10-21 RX ORDER — AMLODIPINE BESYLATE 10 MG/1
10 TABLET ORAL DAILY
Qty: 90 TABLET | Refills: 3 | Status: SHIPPED | OUTPATIENT
Start: 2021-10-21 | End: 2022-11-11

## 2021-10-21 NOTE — PROGRESS NOTES
"Chief Complaint  Annual Exam and Hypertension (management)    Subjective          Adan Chauhan presents to [facility] PRIMARY CARE  History of Present Illness  Adan is a 51-year-old male who presents for annual physical examination with hypertension management.  He has lost approximately 30 pounds since February 25, 2021.  He has been making healthier choices by decreasing portion sizes.  Has also been doing intermittent fasting.  Exercising at least 5-6 times weekly.  Has been doing cardio and weightlifting.  Doing well with his amlodipine medication.  Denied any chest pain, shortness of air, wheezing, cough, headache, dizziness, vision changes, fevers, chills, upper respiratory symptoms, abdominal pain, urinary symptoms, rectal dysfunction or swelling of ankles.  Adan has had left shoulder pain for the past 5 months.  States he cannot recall injuring it or having any trauma.  Describes the pain as a dull ache on top of his left shoulder.  He has had some pain with range of motion.  He is able to move his arm but can be painful at times.  Has tried over-the-counter NSAIDs with some relief.  May have occasional tingling going down his left upper arm.  Does not drop any objects.  He is right-hand dominant.  Bowel movements have been daily without dark black tarry stools.  Would like the flu shot today.     Objective   Vital Signs:   /74   Pulse 64   Temp 98 °F (36.7 °C)   Resp 13   Ht 188 cm (74.02\")   Wt 98 kg (216 lb)   SpO2 96%   BMI 27.72 kg/m²     Physical Exam  Vitals and nursing note reviewed.   Constitutional:       Appearance: Normal appearance. He is well-developed, well-groomed and overweight.      Interventions: Face mask in place.   HENT:      Head: Normocephalic and atraumatic.      Jaw: There is normal jaw occlusion.      Right Ear: Hearing, tympanic membrane, ear canal and external ear normal.      Left Ear: Hearing, tympanic membrane, ear canal and external ear " normal.      Nose: Nose normal.      Right Sinus: No maxillary sinus tenderness or frontal sinus tenderness.      Left Sinus: No maxillary sinus tenderness or frontal sinus tenderness.      Mouth/Throat:      Lips: Pink.      Mouth: Mucous membranes are moist.      Dentition: Normal dentition.      Tongue: No lesions. Tongue does not deviate from midline.      Palate: No mass and lesions.      Pharynx: Oropharynx is clear. Uvula midline.      Tonsils: No tonsillar exudate.   Eyes:      General: Lids are normal.      Conjunctiva/sclera: Conjunctivae normal.      Pupils: Pupils are equal, round, and reactive to light.   Neck:      Thyroid: No thyroid mass, thyromegaly or thyroid tenderness.      Vascular: No carotid bruit.      Trachea: Trachea and phonation normal. No tracheal tenderness.   Cardiovascular:      Rate and Rhythm: Normal rate and regular rhythm.      Pulses: Normal pulses.      Heart sounds: Normal heart sounds, S1 normal and S2 normal. No murmur heard.      Pulmonary:      Effort: Pulmonary effort is normal.      Breath sounds: Normal breath sounds and air entry.   Abdominal:      General: Bowel sounds are normal.      Palpations: Abdomen is soft.      Tenderness: There is no abdominal tenderness. There is no right CVA tenderness, left CVA tenderness, guarding or rebound. Negative signs include Reese's sign, Rovsing's sign, McBurney's sign, psoas sign and obturator sign.   Genitourinary:     Comments: Patient deferred exam  Musculoskeletal:      Right shoulder: Normal.      Left shoulder: Tenderness, bony tenderness and crepitus present. No swelling. Decreased range of motion. Decreased strength. Normal pulse.        Arms:       Cervical back: Neck supple.      Right lower leg: No edema.      Left lower leg: No edema.   Lymphadenopathy:      Cervical: No cervical adenopathy.      Right cervical: No superficial, deep or posterior cervical adenopathy.     Left cervical: No superficial, deep or posterior  cervical adenopathy.   Skin:     General: Skin is warm and dry.      Capillary Refill: Capillary refill takes less than 2 seconds.      Findings: No rash.   Neurological:      Mental Status: He is alert and oriented to person, place, and time.      Sensory: Sensation is intact.      Deep Tendon Reflexes:      Reflex Scores:       Patellar reflexes are 2+ on the right side and 2+ on the left side.  Psychiatric:         Attention and Perception: Attention and perception normal.         Mood and Affect: Mood and affect normal.         Speech: Speech normal.         Behavior: Behavior is cooperative.         Thought Content: Thought content normal.         Cognition and Memory: Cognition and memory normal.         Judgment: Judgment normal.     I was wearing a surgical mask and face shield during the entire office visit/encounter.     Result Review :     CMP    CMP 10/13/21   Glucose 86   BUN 15   Creatinine 1.07   eGFR Non  Am 73   eGFR African Am 89   Sodium 140   Potassium 4.2   Chloride 105   Calcium 10.3   Total Protein 6.4   Albumin 4.40   Globulin 2.0   Total Bilirubin 0.4   Alkaline Phosphatase 64   AST (SGOT) 15   ALT (SGPT) 16      Comments are available for some flowsheets but are not being displayed.           CBC w/diff    CBC w/Diff 10/13/21   WBC 6.67   RBC 5.12   Hemoglobin 15.8   Hematocrit 48.5   MCV 94.7   MCH 30.9   MCHC 32.6   RDW 13.2   Platelets 267   Neutrophil Rel % 59.3   Lymphocyte Rel % 28.2   Monocyte Rel % 9.4   Eosinophil Rel % 2.7   Basophil Rel % 0.4           Lipid Panel    Lipid Panel 10/13/21   Total Cholesterol 172   Triglycerides 99   HDL Cholesterol 48   VLDL Cholesterol 18   LDL Cholesterol  106 (A)   LDL/HDL Ratio 2.17   (A) Abnormal value       Comments are available for some flowsheets but are not being displayed.               PSA    PSA 10/13/21   PSA 2.770                     Assessment and Plan    Diagnoses and all orders for this visit:    1. Annual physical exam  (Primary)    2. Essential hypertension  -     amLODIPine (NORVASC) 10 MG tablet; Take 1 tablet by mouth Daily.  Dispense: 90 tablet; Refill: 3    3. Need for influenza vaccination  -     FluLaval/Fluarix/Fluzone >6 Months    4. Chronic left shoulder pain  -     XR Shoulder 2+ View Left; Future    1.  Annual physical examination with hypertension: Blood pressure was in therapeutic range today.  I have reviewed his lab work with him from above.  I have given him encouragement and praise for his weight loss and eating healthy.  PSA was in therapeutic range.  Will return to office in 6 months for reevaluation.  I have refilled his medication to pharmacy.  2.  Need for influenza vaccination: Adan has given written consent to receive updated flu shot today.  I have asked him to upload his Covid immunization information for my review.  3.  New left shoulder pain: This has been ongoing for the past 5 months.  I will proceed with left shoulder x-ray at [facility] today.  Will be notified of test results when completed.  Will consider physical therapy and/or additional imaging in future if warranted.    Patient Counseling:  --Nutrition: Stressed importance of moderation in sodium/caffeine intake, saturated fat and cholesterol.  Discussed caloric balance, sufficient intake of fresh fruits, vegetables, fiber,   calcium, iron.  --Discussed the new recommendation against daily use of baby aspirin for primary prevention in low risk patients.  --Exercise: Stressed the importance of regular exercise by incorporating into daily routine.    --Substance Abuse: Discussed cessation/primary prevention of tobacco, alcohol, or other drug use; driving or other dangerous activities under the influence.    --Dental health: Discussed importance of regular tooth brushing, flossing, and dental visits.  -- Suggested having eyes and vision checked if needed or past due.  --Immunizations reviewed.  --Discussed benefits of screening  colonoscopy.        Follow Up   Return in about 6 months (around 4/21/2022), or HTN.  Patient was given instructions and counseling regarding his condition or for health maintenance advice. Please see specific information pulled into the AVS if appropriate.     [facility] FAMILY MEDICINE  [address] [place] [id number]  Dept: [phone]  Dept Fax: [phone]  Loc: [phone]  Loc Fax: [phone]

## 2021-10-22 NOTE — PROGRESS NOTES
I, Dr. Aaron Jones, have reviewed the notes, assessments, and/or procedures performed by Thu NEUMANN, that occurred within our practice at [place], I concur with her documentation of Adan Chauhan. I have a current collaborative medical agreement with Thu NEUMANN.

## 2021-11-01 ENCOUNTER — TELEPHONE (OUTPATIENT)
Dept: FAMILY MEDICINE CLINIC | Facility: CLINIC | Age: 51
End: 2021-11-01

## 2021-11-01 DIAGNOSIS — M25.512 CHRONIC LEFT SHOULDER PAIN: Primary | ICD-10-CM

## 2021-11-01 DIAGNOSIS — G89.29 CHRONIC LEFT SHOULDER PAIN: Primary | ICD-10-CM

## 2021-11-01 NOTE — TELEPHONE ENCOUNTER
Please notify patient that MRI of shoulder was denied.  Will send to orthopedist for further evaluation.  Placed orders with Dr. Shaffer in [place]

## 2021-11-01 NOTE — TELEPHONE ENCOUNTER
RENETTA WITH [employer] CALLED TO INFORM US THAT THE CT SHOULDER FOR PATIENT WAS DENIED. IF YOU WISH TO DO A PEER TO PEER YOU CAN CALL: [phone]  CLAIM #: [id number].  PLEASE CALL RENETTA TO INFORM HER OF YOUR DECISION AND OUTCOME.    RENETTA: [phone]

## 2021-11-15 ENCOUNTER — APPOINTMENT (OUTPATIENT)
Dept: MRI IMAGING | Facility: HOSPITAL | Age: 51
End: 2021-11-15

## 2021-11-18 ENCOUNTER — OFFICE VISIT (OUTPATIENT)
Dept: ORTHOPEDIC SURGERY | Facility: CLINIC | Age: 51
End: 2021-11-18

## 2021-11-18 VITALS
DIASTOLIC BLOOD PRESSURE: 80 MMHG | BODY MASS INDEX: 27.72 KG/M2 | HEIGHT: 74 IN | SYSTOLIC BLOOD PRESSURE: 159 MMHG | HEART RATE: 82 BPM | WEIGHT: 216 LBS

## 2021-11-18 DIAGNOSIS — M75.82 TENDINITIS OF LEFT ROTATOR CUFF: ICD-10-CM

## 2021-11-18 DIAGNOSIS — M75.52 SUBACROMIAL BURSITIS OF LEFT SHOULDER JOINT: Primary | ICD-10-CM

## 2021-11-18 PROCEDURE — 99203 OFFICE O/P NEW LOW 30 MIN: CPT | Performed by: ORTHOPAEDIC SURGERY

## 2021-11-18 RX ORDER — MELOXICAM 15 MG/1
15 TABLET ORAL DAILY
Qty: 30 TABLET | Refills: 5 | Status: SHIPPED | OUTPATIENT
Start: 2021-11-18 | End: 2022-05-16

## 2021-11-18 NOTE — PROGRESS NOTES
Subjective: Left shoulder pain     Patient ID: dAan Chauhan is a 51 y.o. male.    Chief Complaint:    History of Present Illness 51-year-old male right-hand-dominant seen by me today for postop regarding his left shoulder which is symptomatic he believes since he was on vacation in [place].  States condominium he was staying at had a workout facility and he was exercising lifting weights that he normally does not do and by the end of the week he was having some shoulder pain which has persisted.  Does not recall any specific incident that but the pain developed was doing a lot of fly work with weights bench presses and push-ups.  He is having persistent reoccurring pain in that shoulder.  He has taken some ibuprofen for couple weeks which helped but is not taking anything therapeutically on a regular basis for longer than 2 weeks.  He was seen by his primary care physician who ordered an x-ray which is reported negative.  An MRI was attempted to be ordered but was denied by the insurance company still was seen here today.  He describes the pain is 6 out of 10 dull type discomfort.  Occasional stabbing pain.  States that during the first few months he had a clicking sensation in the shoulder that has resolved.       Social History     Occupational History   • Occupation: IT   Tobacco Use   • Smoking status: Former Smoker     Packs/day: 0.50     Years: 15.00     Pack years: 7.50     Types: Cigarettes     Start date:      Quit date:      Years since quittin.8   • Smokeless tobacco: Never Used   Vaping Use   • Vaping Use: Never used   Substance and Sexual Activity   • Alcohol use: Yes     Alcohol/week: 7.0 standard drinks     Types: 7 Glasses of wine per week     Comment: 1 g/o red wine daily w/dinner   • Drug use: No   • Sexual activity: Yes     Partners: Female      Review of Systems   Constitutional: Negative for chills, diaphoresis, fever and unexpected weight change.   HENT: Negative for hearing  loss, nosebleeds, sore throat and tinnitus.    Eyes: Negative for pain and visual disturbance.   Respiratory: Negative for cough, shortness of breath and wheezing.    Cardiovascular: Negative for chest pain and palpitations.   Gastrointestinal: Negative for abdominal pain, diarrhea, nausea and vomiting.   Endocrine: Negative for cold intolerance, heat intolerance and polydipsia.   Genitourinary: Negative for difficulty urinating, dysuria and hematuria.   Musculoskeletal: Positive for arthralgias and myalgias. Negative for joint swelling.   Skin: Negative for rash and wound.   Allergic/Immunologic: Negative for environmental allergies.   Neurological: Negative for dizziness, syncope and numbness.   Hematological: Does not bruise/bleed easily.   Psychiatric/Behavioral: Negative for dysphoric mood and sleep disturbance. The patient is not nervous/anxious.          Past Medical History:   Diagnosis Date   • Chest pain    • Hypertension    • Lightheadedness    • MVP (mitral valve prolapse)    • Palpitations    • Sinus tachycardia    • Visual disturbances      Past Surgical History:   Procedure Laterality Date   • ADENOIDECTOMY     • CARPAL TUNNEL RELEASE     • COLONOSCOPY N/A 10/22/2018    Procedure: COLONOSCOPY TO CECUM;  Surgeon: Flaco Fountain MD;  [facility] ENDOSCOPY;  Service: Gastroenterology   • HEMORROIDECTOMY     • TONSILLECTOMY       Family History   Problem Relation Age of Onset   • Stroke Father    • Stroke Paternal Uncle    • Hypertension Paternal Uncle    • Hypertension Paternal Uncle    • Suicide Attempts Mother         suicide   • No Known Problems Maternal Grandfather    • No Known Problems Paternal Grandmother    • No Known Problems Paternal Grandfather    • No Known Problems Other          Objective:  Vitals:    11/18/21 1330   BP: 159/80   Pulse: 82         11/18/21  1330   Weight: 98 kg (216 lb)     Body mass index is 27.73 kg/m².        Ortho Exam   AP and lateral view of the left  shoulder.  Within normal limits showing no acute or chronic changes.  No prior films available for comparison.  He is alert and oriented x3.  Head is normocephalic and sclerae clear.  He has no motor or sensory deficits as far as radial, ulnar median nerve function.  He has full range of motion of the elbow with flexion extension pronation supination.  He can extend about 180 degrees and abduct to about 120 degrees past which is painful.  In 90 degrees of extension abduction and extension are 5/5 with mild pain.  Mildly positive Goodwin sign.  Negative Speed test.  External rotation is to 40 degrees and internal rotation is to approximately L1.  Is good capillary refill the skin is cool to touch.  He tolerates ibuprofen when he takes it without any GI side effect does help with the symptoms but again taken on a regular basis.    Assessment:        1. Subacromial bursitis of left shoulder joint    2. Tendinitis of left rotator cuff           Plan: Reviewed with the patient his x-rays history and physical exam.  Believe is developed a subacromial bursitis and rotator cuff tendinitis.  At reviewing treatment options to start a therapeutic dose of anti-inflammatory in the form of meloxicam 15 mg a day.  Also offered a cortisone injection today but he like to defer.  He will return in a month if still symptomatic otherwise as needed.  I told him to take the medication for the full month and if asymptomatic he then stop the medication and cancel appointment.  Answered all questions            Work Status:    JENNIFER query complete.    Orders:  No orders of the defined types were placed in this encounter.      Medications:  New Medications Ordered This Visit   Medications   • meloxicam (MOBIC) 15 MG tablet     Sig: Take 1 tablet by mouth Daily.     Dispense:  30 tablet     Refill:  5       Followup:  Return in about 4 weeks (around 12/16/2021).          Dictated utilizing Dragon dictation

## 2022-04-22 ENCOUNTER — OFFICE VISIT (OUTPATIENT)
Dept: FAMILY MEDICINE CLINIC | Facility: CLINIC | Age: 52
End: 2022-04-22

## 2022-04-22 VITALS
RESPIRATION RATE: 16 BRPM | TEMPERATURE: 98.2 F | HEART RATE: 62 BPM | WEIGHT: 231 LBS | SYSTOLIC BLOOD PRESSURE: 122 MMHG | DIASTOLIC BLOOD PRESSURE: 80 MMHG | OXYGEN SATURATION: 98 % | HEIGHT: 74 IN | BODY MASS INDEX: 29.65 KG/M2

## 2022-04-22 DIAGNOSIS — I10 ESSENTIAL HYPERTENSION: Primary | ICD-10-CM

## 2022-04-22 DIAGNOSIS — R53.83 FATIGUE, UNSPECIFIED TYPE: ICD-10-CM

## 2022-04-22 PROCEDURE — 99214 OFFICE O/P EST MOD 30 MIN: CPT | Performed by: PHYSICIAN ASSISTANT

## 2022-04-22 RX ORDER — CHLORAL HYDRATE 500 MG
CAPSULE ORAL
COMMUNITY

## 2022-04-22 NOTE — PROGRESS NOTES
"Chief Complaint  Hypertension    Subjective          Adan Chauhan presents to [facility] PR[facility]  History of Present Illness  Adan is a 51-year-old male who presents for hypertension management.  He has been doing well with his blood pressure medication.  Has not been checking at home.  Exercises at least 3-5 times weekly by running, doing elliptical or weight training.  Bowel movements have been daily without dark black tarry stools.  Has been using a probiotic.  Diet has been overall healthy.  Denied any chest pain, shortness of air, dizziness, headache, or swelling of ankles.  He is concerned about possible low testosterone.  States he has had some fatigue issue and his stamina is not as good as he used to be.  He has gained weight as well.  Denied any erectile dysfunction issues.  Would like this checked if possible.  Review of Systems   Constitutional: Positive for fatigue.   All other systems reviewed and are negative.    Objective   Vital Signs:   /80 (BP Location: Left arm, Patient Position: Sitting, Cuff Size: Adult)   Pulse 62   Temp 98.2 °F (36.8 °C)   Resp 16   Ht 188 cm (74\")   Wt 105 kg (231 lb)   SpO2 98%   BMI 29.66 kg/m²     Physical Exam  Vitals and nursing note reviewed.   Constitutional:       Appearance: Normal appearance. He is well-developed, well-groomed and overweight.      Interventions: Face mask in place.   HENT:      Head: Normocephalic and atraumatic.   Neck:      Thyroid: No thyroid mass, thyromegaly or thyroid tenderness.      Vascular: No carotid bruit.      Trachea: Trachea and phonation normal. No tracheal tenderness.   Cardiovascular:      Rate and Rhythm: Normal rate and regular rhythm.      Pulses: Normal pulses.      Heart sounds: Normal heart sounds, S1 normal and S2 normal. No murmur heard.  Pulmonary:      Effort: Pulmonary effort is normal.      Breath sounds: Normal breath sounds and air entry.   Abdominal:      General: Bowel " sounds are normal.      Palpations: Abdomen is soft. There is no hepatomegaly.      Tenderness: There is no abdominal tenderness. There is no right CVA tenderness, left CVA tenderness, guarding or rebound. Negative signs include Reese's sign, Rovsing's sign, McBurney's sign, psoas sign and obturator sign.   Musculoskeletal:      Cervical back: Neck supple.      Right lower leg: No edema.      Left lower leg: No edema.   Skin:     General: Skin is warm and dry.      Capillary Refill: Capillary refill takes less than 2 seconds.   Neurological:      Mental Status: He is alert and oriented to person, place, and time.   Psychiatric:         Attention and Perception: Attention and perception normal.         Mood and Affect: Mood and affect normal.         Speech: Speech normal.         Behavior: Behavior normal. Behavior is cooperative.         Thought Content: Thought content normal.         Cognition and Memory: Cognition and memory normal.         Judgment: Judgment normal.     I wore a facial mask, face shield, and gloves during this patient encounter.  Patient also wearing a surgical mask.  Hand hygiene performed before and after seeing the patient.     Result Review :                 Assessment and Plan    Diagnoses and all orders for this visit:    1. Essential hypertension (Primary)    2. Fatigue, unspecified type  -     Testosterone (Free & Total), LC / MS    1.  Chronic and stable hypertension: Blood pressure was in therapeutic range.  He will continue his current amlodipine medication at home as directed.  Plan to follow-up in 6 months for annual physical examination.  2.  New fatigue: We will check free and total testosterone.  He will be notified of test results and any medication changes.      Follow Up   Return in about 6 months (around 10/22/2022), or labs prior, for Annual.  Patient was given instructions and counseling regarding his condition or for health maintenance advice. Please see specific  information pulled into the AVS if appropriate.     [facility] [address] [address]  Dept: [phone]  Dept Fax: [phone]  Loc: [phone]  Loc Fax: [phone]        Answers for HPI/ROS submitted by the patient on 4/22/2022  What is the primary reason for your visit?: Physical

## 2022-04-27 ENCOUNTER — TELEPHONE (OUTPATIENT)
Dept: FAMILY MEDICINE CLINIC | Facility: CLINIC | Age: 52
End: 2022-04-27

## 2022-04-27 NOTE — TELEPHONE ENCOUNTER
Caller: Adan Chauhan    Relationship: Self    Best call back number: [phone]*    What is the best time to reach you: ANYTIME    Who are you requesting to speak with (clinical staff, provider,  specific staff member): CLINICAL    What was the call regarding: PATIENT CALLING STATING THAT HE STILL HASN'T RECEIVED THE RESULTS OF HIS LABS THAT WERE DONE ON 4/22, AND ALSO THE CHARGE STILL HAS NOT CLEARED HIS CHECKING ACCOUNT. THE PATIENT IS WANTING TO CHECK TO SEE IF EVERYTHING IS OKAY, AND ALSO TO GET THE RESULT OF HIS LABS.    Do you require a callback: YES

## 2022-04-27 NOTE — TELEPHONE ENCOUNTER
Returned call to pt and informed him that we had not received his labs back yet. Informed that they have been taking a little longer than usual and could be next week before we get them. Also informed him that as far as all charges, he would need to call [facility] or his bank to see why the payment has not cleared yet.

## 2022-04-29 NOTE — TELEPHONE ENCOUNTER
Please notify patient that the lab is taking up to 2 weeks for the testosterone test.  Hopefully will get this back next week.  He will be notified of test results as soon as we get them.

## 2022-04-29 NOTE — TELEPHONE ENCOUNTER
Caller: Adan Chauhan    Relationship: Self    Best call back number: [phone]    What is the best time to reach you: ANY    Who are you requesting to speak with (clinical staff, provider,  specific staff member):CLINICAL STAFF    Do you know the name of the person who called: PATIENT    What was the call regarding:PATIENT CALLED BACK ABOUT HIS LAB WORKS    Do you require a callback: YES

## 2022-05-02 LAB
TESTOST FREE SERPL-MCNC: 7.4 PG/ML (ref 7.2–24)
TESTOST SERPL-MCNC: 249.1 NG/DL (ref 264–916)

## 2022-05-04 DIAGNOSIS — E34.9 HYPOTESTOSTERONEMIA: Primary | ICD-10-CM

## 2022-05-04 RX ORDER — TESTOSTERONE 16.2 MG/G
GEL TRANSDERMAL
Qty: 75 G | Refills: 0 | Status: SHIPPED | OUTPATIENT
Start: 2022-05-04 | End: 2022-05-31 | Stop reason: SDUPTHER

## 2022-05-16 RX ORDER — MELOXICAM 15 MG/1
TABLET ORAL
Qty: 30 TABLET | Refills: 5 | Status: ON HOLD | OUTPATIENT
Start: 2022-05-16 | End: 2022-09-10

## 2022-05-16 NOTE — TELEPHONE ENCOUNTER
Rx Refill Note  Requested Prescriptions     Pending Prescriptions Disp Refills    meloxicam (MOBIC) 15 MG tablet [Pharmacy Med Name: MELOXICAM 15 MG TABLET] 30 tablet 5     Sig: TAKE ONE TABLET BY MOUTH DAILY      Last office visit with prescribing clinician: 11/18/2021      Next office visit with prescribing clinician: Visit date not found   Last Filled:11.18.2021  DX:  1. Subacromial bursitis of left shoulder joint    2. Tendinitis of left rotator cuff           Elizabeth Vicente MA  05/16/22, 09:08 EDT    {TIP  Encounters:    {TIP  Please add Last Relevant Lab Date if appropriate:  {TIP  Is Refill Pharmacy correct?:

## 2022-05-25 DIAGNOSIS — J30.89 NON-SEASONAL ALLERGIC RHINITIS, UNSPECIFIED TRIGGER: ICD-10-CM

## 2022-05-25 RX ORDER — CETIRIZINE HYDROCHLORIDE 10 MG/1
TABLET ORAL
Qty: 90 TABLET | Refills: 2 | Status: SHIPPED | OUTPATIENT
Start: 2022-05-25

## 2022-05-31 DIAGNOSIS — E34.9 HYPOTESTOSTERONEMIA: ICD-10-CM

## 2022-06-01 RX ORDER — TESTOSTERONE 16.2 MG/G
GEL TRANSDERMAL
Qty: 75 G | Refills: 0 | Status: SHIPPED | OUTPATIENT
Start: 2022-06-01 | End: 2022-06-02 | Stop reason: SDUPTHER

## 2022-06-02 DIAGNOSIS — E34.9 HYPOTESTOSTERONEMIA: ICD-10-CM

## 2022-06-02 NOTE — TELEPHONE ENCOUNTER
Caller: [facility]    Relationship:     Best call back number: [place]  - [place] - [address] [address] - [phone] [facility] [phone] FX  Requested Prescriptions:   Requested Prescriptions     Pending Prescriptions Disp Refills   • Testosterone 20.25 MG/ACT (1.62%) gel 75 g 0     Sig: Apply 1 pump to bilateral shoulders once in AM.        Pharmacy where request should be sent:    [facility] - [address] [phone] [facility] [phone] FX    Does the patient have less than a 3 day supply:  [x] Yes  [] No    Mike Pham   06/02/22 12:12 EDT

## 2022-06-03 RX ORDER — TESTOSTERONE 16.2 MG/G
GEL TRANSDERMAL
Qty: 75 G | Refills: 0 | Status: SHIPPED | OUTPATIENT
Start: 2022-06-03 | End: 2022-07-08 | Stop reason: SDUPTHER

## 2022-07-08 DIAGNOSIS — E34.9 HYPOTESTOSTERONEMIA: ICD-10-CM

## 2022-07-08 RX ORDER — TESTOSTERONE 16.2 MG/G
GEL TRANSDERMAL
Qty: 75 G | Refills: 5 | Status: SHIPPED | OUTPATIENT
Start: 2022-07-08 | End: 2022-09-28

## 2022-07-12 ENCOUNTER — TELEPHONE (OUTPATIENT)
Dept: FAMILY MEDICINE CLINIC | Facility: CLINIC | Age: 52
End: 2022-07-12

## 2022-07-12 NOTE — TELEPHONE ENCOUNTER
Pharmacy states if patient does 2 clicks a day it will save him money, I advised them I would let you decide this.

## 2022-07-18 DIAGNOSIS — E34.9 HYPOTESTOSTERONEMIA: Primary | ICD-10-CM

## 2022-07-18 NOTE — TELEPHONE ENCOUNTER
Notify patient I would like to recheck his testosterone levels to see where they are at before adjusting dose of medication.  Orders have been placed.  He will have to be fasting.

## 2022-09-10 ENCOUNTER — ANESTHESIA EVENT (OUTPATIENT)
Dept: PERIOP | Facility: HOSPITAL | Age: 52
End: 2022-09-10

## 2022-09-10 ENCOUNTER — APPOINTMENT (OUTPATIENT)
Dept: CT IMAGING | Facility: HOSPITAL | Age: 52
End: 2022-09-10

## 2022-09-10 ENCOUNTER — HOSPITAL ENCOUNTER (OUTPATIENT)
Facility: HOSPITAL | Age: 52
Discharge: HOME | End: 2022-09-11
Attending: EMERGENCY MEDICINE | Admitting: SURGERY

## 2022-09-10 ENCOUNTER — ANESTHESIA (OUTPATIENT)
Dept: PERIOP | Facility: HOSPITAL | Age: 52
End: 2022-09-10

## 2022-09-10 DIAGNOSIS — K35.80 ACUTE APPENDICITIS, UNSPECIFIED ACUTE APPENDICITIS TYPE: Primary | ICD-10-CM

## 2022-09-10 DIAGNOSIS — N40.0 ENLARGED PROSTATE: ICD-10-CM

## 2022-09-10 DIAGNOSIS — K35.80 ACUTE APPENDICITIS: ICD-10-CM

## 2022-09-10 LAB
ALBUMIN SERPL-MCNC: 4 G/DL (ref 3.5–5.2)
ALBUMIN/GLOB SERPL: 1.5 G/DL
ALP SERPL-CCNC: 69 U/L (ref 39–117)
ALT SERPL W P-5'-P-CCNC: 22 U/L (ref 1–41)
ANION GAP SERPL CALCULATED.3IONS-SCNC: 7.8 MMOL/L (ref 5–15)
AST SERPL-CCNC: 21 U/L (ref 1–40)
BACTERIA UR QL AUTO: ABNORMAL /HPF
BASOPHILS # BLD AUTO: 0.02 10*3/MM3 (ref 0–0.2)
BASOPHILS NFR BLD AUTO: 0.3 % (ref 0–1.5)
BILIRUB SERPL-MCNC: 0.4 MG/DL (ref 0–1.2)
BILIRUB UR QL STRIP: NEGATIVE
BUN SERPL-MCNC: 15 MG/DL (ref 6–20)
BUN/CREAT SERPL: 12.1 (ref 7–25)
CALCIUM SPEC-SCNC: 9.8 MG/DL (ref 8.6–10.5)
CHLORIDE SERPL-SCNC: 105 MMOL/L (ref 98–107)
CLARITY UR: CLEAR
CO2 SERPL-SCNC: 25.2 MMOL/L (ref 22–29)
COLOR UR: YELLOW
CREAT SERPL-MCNC: 1.24 MG/DL (ref 0.76–1.27)
DEPRECATED RDW RBC AUTO: 46.6 FL (ref 37–54)
EGFRCR SERPLBLD CKD-EPI 2021: 70.8 ML/MIN/1.73
EOSINOPHIL # BLD AUTO: 0 10*3/MM3 (ref 0–0.4)
EOSINOPHIL NFR BLD AUTO: 0 % (ref 0.3–6.2)
ERYTHROCYTE [DISTWIDTH] IN BLOOD BY AUTOMATED COUNT: 13.5 % (ref 12.3–15.4)
GLOBULIN UR ELPH-MCNC: 2.6 GM/DL
GLUCOSE SERPL-MCNC: 111 MG/DL (ref 65–99)
GLUCOSE UR STRIP-MCNC: NEGATIVE MG/DL
HCT VFR BLD AUTO: 50 % (ref 37.5–51)
HGB BLD-MCNC: 16.5 G/DL (ref 13–17.7)
HGB UR QL STRIP.AUTO: ABNORMAL
HYALINE CASTS UR QL AUTO: ABNORMAL /LPF
IMM GRANULOCYTES # BLD AUTO: 0.02 10*3/MM3 (ref 0–0.05)
IMM GRANULOCYTES NFR BLD AUTO: 0.3 % (ref 0–0.5)
KETONES UR QL STRIP: NEGATIVE
LEUKOCYTE ESTERASE UR QL STRIP.AUTO: NEGATIVE
LIPASE SERPL-CCNC: 27 U/L (ref 13–60)
LYMPHOCYTES # BLD AUTO: 0.54 10*3/MM3 (ref 0.7–3.1)
LYMPHOCYTES NFR BLD AUTO: 7 % (ref 19.6–45.3)
MCH RBC QN AUTO: 31 PG (ref 26.6–33)
MCHC RBC AUTO-ENTMCNC: 33 G/DL (ref 31.5–35.7)
MCV RBC AUTO: 93.8 FL (ref 79–97)
MONOCYTES # BLD AUTO: 0.95 10*3/MM3 (ref 0.1–0.9)
MONOCYTES NFR BLD AUTO: 12.4 % (ref 5–12)
MUCOUS THREADS URNS QL MICRO: ABNORMAL /HPF
NEUTROPHILS NFR BLD AUTO: 6.15 10*3/MM3 (ref 1.7–7)
NEUTROPHILS NFR BLD AUTO: 80 % (ref 42.7–76)
NITRITE UR QL STRIP: NEGATIVE
NRBC BLD AUTO-RTO: 0 /100 WBC (ref 0–0.2)
PH UR STRIP.AUTO: 7 [PH] (ref 4.5–8)
PLATELET # BLD AUTO: 207 10*3/MM3 (ref 140–450)
PMV BLD AUTO: 10.5 FL (ref 6–12)
POTASSIUM SERPL-SCNC: 3.8 MMOL/L (ref 3.5–5.2)
PROT SERPL-MCNC: 6.6 G/DL (ref 6–8.5)
PROT UR QL STRIP: NEGATIVE
RBC # BLD AUTO: 5.33 10*6/MM3 (ref 4.14–5.8)
RBC # UR STRIP: ABNORMAL /HPF
REF LAB TEST METHOD: ABNORMAL
SODIUM SERPL-SCNC: 138 MMOL/L (ref 136–145)
SP GR UR STRIP: 1.02 (ref 1–1.03)
SQUAMOUS #/AREA URNS HPF: ABNORMAL /HPF
UROBILINOGEN UR QL STRIP: ABNORMAL
WBC # UR STRIP: ABNORMAL /HPF
WBC NRBC COR # BLD: 7.68 10*3/MM3 (ref 3.4–10.8)

## 2022-09-10 PROCEDURE — 99220 PR INITIAL OBSERVATION CARE/DAY 70 MINUTES: CPT | Performed by: SURGERY

## 2022-09-10 PROCEDURE — 0 IOPAMIDOL PER 1 ML: Performed by: EMERGENCY MEDICINE

## 2022-09-10 PROCEDURE — 25010000002 DEXAMETHASONE PER 1 MG: Performed by: NURSE ANESTHETIST, CERTIFIED REGISTERED

## 2022-09-10 PROCEDURE — 25010000002 FENTANYL CITRATE (PF) 50 MCG/ML SOLUTION: Performed by: NURSE ANESTHETIST, CERTIFIED REGISTERED

## 2022-09-10 PROCEDURE — 44970 LAPAROSCOPY APPENDECTOMY: CPT | Performed by: SPECIALIST/TECHNOLOGIST, OTHER

## 2022-09-10 PROCEDURE — 85025 COMPLETE CBC W/AUTO DIFF WBC: CPT | Performed by: EMERGENCY MEDICINE

## 2022-09-10 PROCEDURE — 25010000002 NEOSTIGMINE 10 MG/10ML SOLUTION: Performed by: NURSE ANESTHETIST, CERTIFIED REGISTERED

## 2022-09-10 PROCEDURE — 25010000002 MIDAZOLAM PER 1MG: Performed by: NURSE ANESTHETIST, CERTIFIED REGISTERED

## 2022-09-10 PROCEDURE — 25010000002 PIPERACILLIN SOD-TAZOBACTAM PER 1 G: Performed by: NURSE ANESTHETIST, CERTIFIED REGISTERED

## 2022-09-10 PROCEDURE — 25010000002 FENTANYL CITRATE (PF) 100 MCG/2ML SOLUTION: Performed by: NURSE ANESTHETIST, CERTIFIED REGISTERED

## 2022-09-10 PROCEDURE — 83690 ASSAY OF LIPASE: CPT | Performed by: EMERGENCY MEDICINE

## 2022-09-10 PROCEDURE — 99284 EMERGENCY DEPT VISIT MOD MDM: CPT

## 2022-09-10 PROCEDURE — 44970 LAPAROSCOPY APPENDECTOMY: CPT | Performed by: SURGERY

## 2022-09-10 PROCEDURE — 25010000002 ONDANSETRON PER 1 MG: Performed by: NURSE ANESTHETIST, CERTIFIED REGISTERED

## 2022-09-10 PROCEDURE — 25010000002 SUCCINYLCHOLINE PER 20 MG: Performed by: NURSE ANESTHETIST, CERTIFIED REGISTERED

## 2022-09-10 PROCEDURE — G0378 HOSPITAL OBSERVATION PER HR: HCPCS

## 2022-09-10 PROCEDURE — 25010000002 KETOROLAC TROMETHAMINE PER 15 MG: Performed by: NURSE ANESTHETIST, CERTIFIED REGISTERED

## 2022-09-10 PROCEDURE — 25010000002 PROPOFOL 200 MG/20ML EMULSION: Performed by: NURSE ANESTHETIST, CERTIFIED REGISTERED

## 2022-09-10 PROCEDURE — 94761 N-INVAS EAR/PLS OXIMETRY MLT: CPT

## 2022-09-10 PROCEDURE — 74177 CT ABD & PELVIS W/CONTRAST: CPT

## 2022-09-10 PROCEDURE — 80053 COMPREHEN METABOLIC PANEL: CPT | Performed by: EMERGENCY MEDICINE

## 2022-09-10 PROCEDURE — 96374 THER/PROPH/DIAG INJ IV PUSH: CPT

## 2022-09-10 PROCEDURE — 81001 URINALYSIS AUTO W/SCOPE: CPT | Performed by: EMERGENCY MEDICINE

## 2022-09-10 PROCEDURE — 25010000002 HYDROMORPHONE PER 4 MG: Performed by: SURGERY

## 2022-09-10 PROCEDURE — 94799 UNLISTED PULMONARY SVC/PX: CPT

## 2022-09-10 PROCEDURE — 88304 TISSUE EXAM BY PATHOLOGIST: CPT | Performed by: SURGERY

## 2022-09-10 DEVICE — THE ECHELON, ECHELON ENDOPATH™ AND ECHELON FLEX™ FAMILIES OF ENDOSCOPIC LINEAR CUTTERS AND RELOADS ARE STERILE, SINGLE PATIENT USE INSTRUMENTS THAT SIMULTANEOUSLY CUT AND STAPLE TISSUE. THERE ARE SIX STAGGERED ROWS OF STAPLES, THREE ON EITHER SIDE OF THE CUT LINE. THE 45 MM INSTRUMENTS HAVE A STAPLE LINE THATIS APPROXIMATELY 45 MM LONG AND A CUT LINE THAT IS APPROXIMATELY 42 MM LONG. THE SHAFT CAN ROTATE FREELY IN BOTH DIRECTIONS AND AN ARTICULATION MECHANISM ON ARTICULATING INSTRUMENTS ENABLES BENDING THE DISTAL PORTIONOF THE SHAFT TO FACILITATE LATERAL ACCESS OF THE OPERATIVE SITE.THE INSTRUMENTS ARE SHIPPED WITHOUT A RELOAD AND MUST BE LOADED PRIOR TO USE. A STAPLE RETAINING CAP ON THE RELOAD PROTECTS THE STAPLE LEG POINTS DURING SHIPPING AND TRANSPORTATION. THE INSTRUMENTS’ LOCK-OUT FEATURE IS DESIGNED TO PREVENT A USED RELOAD FROM BEING REFIRED.
Type: IMPLANTABLE DEVICE | Site: ABDOMEN | Status: FUNCTIONAL
Brand: ECHELON ENDOPATH

## 2022-09-10 RX ORDER — LIDOCAINE HYDROCHLORIDE 20 MG/ML
INJECTION, SOLUTION INFILTRATION; PERINEURAL AS NEEDED
Status: DISCONTINUED | OUTPATIENT
Start: 2022-09-10 | End: 2022-09-10 | Stop reason: SURG

## 2022-09-10 RX ORDER — SODIUM CHLORIDE, SODIUM LACTATE, POTASSIUM CHLORIDE, CALCIUM CHLORIDE 600; 310; 30; 20 MG/100ML; MG/100ML; MG/100ML; MG/100ML
100 INJECTION, SOLUTION INTRAVENOUS CONTINUOUS
Status: DISCONTINUED | OUTPATIENT
Start: 2022-09-10 | End: 2022-09-11

## 2022-09-10 RX ORDER — ROCURONIUM BROMIDE 10 MG/ML
INJECTION, SOLUTION INTRAVENOUS AS NEEDED
Status: DISCONTINUED | OUTPATIENT
Start: 2022-09-10 | End: 2022-09-10 | Stop reason: SURG

## 2022-09-10 RX ORDER — FENTANYL CITRATE 50 UG/ML
INJECTION, SOLUTION INTRAMUSCULAR; INTRAVENOUS AS NEEDED
Status: DISCONTINUED | OUTPATIENT
Start: 2022-09-10 | End: 2022-09-10 | Stop reason: SURG

## 2022-09-10 RX ORDER — BUPIVACAINE HYDROCHLORIDE 5 MG/ML
INJECTION, SOLUTION EPIDURAL; INTRACAUDAL AS NEEDED
Status: DISCONTINUED | OUTPATIENT
Start: 2022-09-10 | End: 2022-09-10 | Stop reason: HOSPADM

## 2022-09-10 RX ORDER — FAMOTIDINE 10 MG/ML
20 INJECTION, SOLUTION INTRAVENOUS
Status: DISCONTINUED | OUTPATIENT
Start: 2022-09-10 | End: 2022-09-10

## 2022-09-10 RX ORDER — FENTANYL CITRATE 50 UG/ML
50 INJECTION, SOLUTION INTRAMUSCULAR; INTRAVENOUS
Status: DISCONTINUED | OUTPATIENT
Start: 2022-09-10 | End: 2022-09-10 | Stop reason: HOSPADM

## 2022-09-10 RX ORDER — HYDROMORPHONE HCL 110MG/55ML
0.5 PATIENT CONTROLLED ANALGESIA SYRINGE INTRAVENOUS
Status: DISCONTINUED | OUTPATIENT
Start: 2022-09-10 | End: 2022-09-11 | Stop reason: HOSPADM

## 2022-09-10 RX ORDER — SODIUM CHLORIDE 9 MG/ML
INJECTION, SOLUTION INTRAVENOUS
Status: DISPENSED
Start: 2022-09-10 | End: 2022-09-11

## 2022-09-10 RX ORDER — FENTANYL CITRATE 50 UG/ML
50 INJECTION, SOLUTION INTRAMUSCULAR; INTRAVENOUS ONCE
Status: COMPLETED | OUTPATIENT
Start: 2022-09-10 | End: 2022-09-10

## 2022-09-10 RX ORDER — FAMOTIDINE 10 MG/ML
20 INJECTION, SOLUTION INTRAVENOUS ONCE
Status: COMPLETED | OUTPATIENT
Start: 2022-09-10 | End: 2022-09-10

## 2022-09-10 RX ORDER — DEXAMETHASONE SODIUM PHOSPHATE 4 MG/ML
8 INJECTION, SOLUTION INTRA-ARTICULAR; INTRALESIONAL; INTRAMUSCULAR; INTRAVENOUS; SOFT TISSUE ONCE
Status: COMPLETED | OUTPATIENT
Start: 2022-09-10 | End: 2022-09-10

## 2022-09-10 RX ORDER — MIDAZOLAM HYDROCHLORIDE 2 MG/2ML
1 INJECTION, SOLUTION INTRAMUSCULAR; INTRAVENOUS
Status: DISCONTINUED | OUTPATIENT
Start: 2022-09-10 | End: 2022-09-10 | Stop reason: HOSPADM

## 2022-09-10 RX ORDER — SODIUM CHLORIDE, SODIUM LACTATE, POTASSIUM CHLORIDE, CALCIUM CHLORIDE 600; 310; 30; 20 MG/100ML; MG/100ML; MG/100ML; MG/100ML
9 INJECTION, SOLUTION INTRAVENOUS CONTINUOUS
Status: DISCONTINUED | OUTPATIENT
Start: 2022-09-10 | End: 2022-09-10

## 2022-09-10 RX ORDER — ONDANSETRON 2 MG/ML
4 INJECTION INTRAMUSCULAR; INTRAVENOUS ONCE AS NEEDED
Status: COMPLETED | OUTPATIENT
Start: 2022-09-10 | End: 2022-09-10

## 2022-09-10 RX ORDER — PROPOFOL 10 MG/ML
INJECTION, EMULSION INTRAVENOUS AS NEEDED
Status: DISCONTINUED | OUTPATIENT
Start: 2022-09-10 | End: 2022-09-10 | Stop reason: SURG

## 2022-09-10 RX ORDER — NEOSTIGMINE METHYLSULFATE 1 MG/ML
INJECTION, SOLUTION INTRAVENOUS AS NEEDED
Status: DISCONTINUED | OUTPATIENT
Start: 2022-09-10 | End: 2022-09-10 | Stop reason: SURG

## 2022-09-10 RX ORDER — MULTIVIT WITH MINERALS/LUTEIN
1000 TABLET ORAL DAILY
COMMUNITY

## 2022-09-10 RX ORDER — ONDANSETRON 4 MG/1
4 TABLET, FILM COATED ORAL EVERY 6 HOURS PRN
Status: DISCONTINUED | OUTPATIENT
Start: 2022-09-10 | End: 2022-09-11 | Stop reason: HOSPADM

## 2022-09-10 RX ORDER — KETOROLAC TROMETHAMINE 30 MG/ML
INJECTION, SOLUTION INTRAMUSCULAR; INTRAVENOUS AS NEEDED
Status: DISCONTINUED | OUTPATIENT
Start: 2022-09-10 | End: 2022-09-10 | Stop reason: SURG

## 2022-09-10 RX ORDER — LIDOCAINE HYDROCHLORIDE AND EPINEPHRINE 15; 5 MG/ML; UG/ML
INJECTION, SOLUTION EPIDURAL AS NEEDED
Status: DISCONTINUED | OUTPATIENT
Start: 2022-09-10 | End: 2022-09-10 | Stop reason: HOSPADM

## 2022-09-10 RX ORDER — DEXMEDETOMIDINE HYDROCHLORIDE 100 UG/ML
INJECTION, SOLUTION INTRAVENOUS AS NEEDED
Status: DISCONTINUED | OUTPATIENT
Start: 2022-09-10 | End: 2022-09-10 | Stop reason: SURG

## 2022-09-10 RX ORDER — HYDROCODONE BITARTRATE AND ACETAMINOPHEN 5; 325 MG/1; MG/1
1 TABLET ORAL EVERY 4 HOURS PRN
Status: DISCONTINUED | OUTPATIENT
Start: 2022-09-10 | End: 2022-09-11 | Stop reason: HOSPADM

## 2022-09-10 RX ORDER — PIPERACILLIN SODIUM, TAZOBACTAM SODIUM 3; .375 G/15ML; G/15ML
INJECTION, POWDER, LYOPHILIZED, FOR SOLUTION INTRAVENOUS AS NEEDED
Status: DISCONTINUED | OUTPATIENT
Start: 2022-09-10 | End: 2022-09-10 | Stop reason: SURG

## 2022-09-10 RX ORDER — ONDANSETRON 2 MG/ML
4 INJECTION INTRAMUSCULAR; INTRAVENOUS ONCE AS NEEDED
Status: DISCONTINUED | OUTPATIENT
Start: 2022-09-10 | End: 2022-09-10 | Stop reason: HOSPADM

## 2022-09-10 RX ORDER — NALOXONE HCL 0.4 MG/ML
0.1 VIAL (ML) INJECTION
Status: DISCONTINUED | OUTPATIENT
Start: 2022-09-10 | End: 2022-09-11 | Stop reason: HOSPADM

## 2022-09-10 RX ORDER — SODIUM CHLORIDE 9 MG/ML
INJECTION, SOLUTION INTRAVENOUS AS NEEDED
Status: DISCONTINUED | OUTPATIENT
Start: 2022-09-10 | End: 2022-09-10 | Stop reason: HOSPADM

## 2022-09-10 RX ORDER — MAGNESIUM HYDROXIDE 1200 MG/15ML
LIQUID ORAL AS NEEDED
Status: DISCONTINUED | OUTPATIENT
Start: 2022-09-10 | End: 2022-09-10 | Stop reason: HOSPADM

## 2022-09-10 RX ORDER — GLYCOPYRROLATE 0.2 MG/ML
INJECTION INTRAMUSCULAR; INTRAVENOUS AS NEEDED
Status: DISCONTINUED | OUTPATIENT
Start: 2022-09-10 | End: 2022-09-10 | Stop reason: SURG

## 2022-09-10 RX ORDER — SODIUM CHLORIDE, SODIUM LACTATE, POTASSIUM CHLORIDE, CALCIUM CHLORIDE 600; 310; 30; 20 MG/100ML; MG/100ML; MG/100ML; MG/100ML
100 INJECTION, SOLUTION INTRAVENOUS CONTINUOUS
Status: DISCONTINUED | OUTPATIENT
Start: 2022-09-10 | End: 2022-09-11 | Stop reason: HOSPADM

## 2022-09-10 RX ORDER — PIPERACILLIN SODIUM, TAZOBACTAM SODIUM 3; .375 G/15ML; G/15ML
INJECTION, POWDER, LYOPHILIZED, FOR SOLUTION INTRAVENOUS
Status: COMPLETED
Start: 2022-09-10 | End: 2022-09-10

## 2022-09-10 RX ORDER — KETAMINE HYDROCHLORIDE 10 MG/ML
INJECTION INTRAMUSCULAR; INTRAVENOUS AS NEEDED
Status: DISCONTINUED | OUTPATIENT
Start: 2022-09-10 | End: 2022-09-10 | Stop reason: SURG

## 2022-09-10 RX ORDER — SUCCINYLCHOLINE CHLORIDE 20 MG/ML
INJECTION INTRAMUSCULAR; INTRAVENOUS AS NEEDED
Status: DISCONTINUED | OUTPATIENT
Start: 2022-09-10 | End: 2022-09-10 | Stop reason: SURG

## 2022-09-10 RX ORDER — KETOROLAC TROMETHAMINE 30 MG/ML
15 INJECTION, SOLUTION INTRAMUSCULAR; INTRAVENOUS EVERY 6 HOURS PRN
Status: DISCONTINUED | OUTPATIENT
Start: 2022-09-10 | End: 2022-09-11 | Stop reason: HOSPADM

## 2022-09-10 RX ORDER — ONDANSETRON 2 MG/ML
4 INJECTION INTRAMUSCULAR; INTRAVENOUS EVERY 6 HOURS PRN
Status: DISCONTINUED | OUTPATIENT
Start: 2022-09-10 | End: 2022-09-11 | Stop reason: HOSPADM

## 2022-09-10 RX ADMIN — MIDAZOLAM HYDROCHLORIDE 1 MG: 1 INJECTION, SOLUTION INTRAMUSCULAR; INTRAVENOUS at 20:10

## 2022-09-10 RX ADMIN — IOPAMIDOL 100 ML: 755 INJECTION, SOLUTION INTRAVENOUS at 17:56

## 2022-09-10 RX ADMIN — PROPOFOL 200 MG: 10 INJECTION, EMULSION INTRAVENOUS at 20:20

## 2022-09-10 RX ADMIN — KETOROLAC TROMETHAMINE 30 MG: 30 INJECTION, SOLUTION INTRAMUSCULAR; INTRAVENOUS at 21:17

## 2022-09-10 RX ADMIN — SODIUM CHLORIDE, POTASSIUM CHLORIDE, SODIUM LACTATE AND CALCIUM CHLORIDE 9 ML/HR: 600; 310; 30; 20 INJECTION, SOLUTION INTRAVENOUS at 20:10

## 2022-09-10 RX ADMIN — NEOSTIGMINE METHYLSULFATE 3 MG: 0.5 INJECTION INTRAVENOUS at 21:09

## 2022-09-10 RX ADMIN — GLYCOPYRROLATE 0.6 MG: 0.2 INJECTION INTRAMUSCULAR; INTRAVENOUS at 21:09

## 2022-09-10 RX ADMIN — ROCURONIUM BROMIDE 25 MG: 10 INJECTION, SOLUTION INTRAVENOUS at 20:31

## 2022-09-10 RX ADMIN — SUCCINYLCHOLINE CHLORIDE 200 MG: 20 INJECTION, SOLUTION INTRAMUSCULAR; INTRAVENOUS at 20:20

## 2022-09-10 RX ADMIN — HYDROMORPHONE HYDROCHLORIDE 0.5 MG: 2 INJECTION, SOLUTION INTRAMUSCULAR; INTRAVENOUS; SUBCUTANEOUS at 23:04

## 2022-09-10 RX ADMIN — FAMOTIDINE 20 MG: 10 INJECTION INTRAVENOUS at 16:21

## 2022-09-10 RX ADMIN — DEXAMETHASONE SODIUM PHOSPHATE 8 MG: 4 INJECTION, SOLUTION INTRAMUSCULAR; INTRAVENOUS at 20:09

## 2022-09-10 RX ADMIN — FENTANYL CITRATE 50 MCG: 50 INJECTION, SOLUTION INTRAMUSCULAR; INTRAVENOUS at 21:28

## 2022-09-10 RX ADMIN — SODIUM CHLORIDE, POTASSIUM CHLORIDE, SODIUM LACTATE AND CALCIUM CHLORIDE 100 ML/HR: 600; 310; 30; 20 INJECTION, SOLUTION INTRAVENOUS at 22:36

## 2022-09-10 RX ADMIN — ONDANSETRON 4 MG: 2 INJECTION INTRAMUSCULAR; INTRAVENOUS at 20:09

## 2022-09-10 RX ADMIN — DEXMEDETOMIDINE 20 MCG: 100 INJECTION, SOLUTION, CONCENTRATE INTRAVENOUS at 20:17

## 2022-09-10 RX ADMIN — FENTANYL CITRATE 50 MCG: 50 INJECTION, SOLUTION INTRAMUSCULAR; INTRAVENOUS at 20:37

## 2022-09-10 RX ADMIN — FENTANYL CITRATE 50 MCG: 50 INJECTION, SOLUTION INTRAMUSCULAR; INTRAVENOUS at 20:09

## 2022-09-10 RX ADMIN — ROCURONIUM BROMIDE 5 MG: 10 INJECTION, SOLUTION INTRAVENOUS at 20:17

## 2022-09-10 RX ADMIN — KETAMINE HYDROCHLORIDE 30 MG: 10 INJECTION INTRAMUSCULAR; INTRAVENOUS at 20:17

## 2022-09-10 RX ADMIN — LIDOCAINE HYDROCHLORIDE 100 MG: 20 INJECTION, SOLUTION INFILTRATION; PERINEURAL at 20:17

## 2022-09-10 RX ADMIN — PIPERACILLIN AND TAZOBACTAM 3.38 G: 3; .375 INJECTION, POWDER, FOR SOLUTION INTRAVENOUS at 20:32

## 2022-09-10 NOTE — ED PROVIDER NOTES
Subjective   PIT  History of Present Illness    Chief complaint: Abdominal pain    Location: Diffuse    Quality/Severity: 8/10 at its worst, 5/10 current    Timing/Onset/Duration: Started last night, waxing and waning    Modifying Factors: Time seems to make it better.  Eating aggravates the pain.    Associated Symptoms: Mild headache headache.  No fever chills or cough.  No sore throat earache or nasal congestion.  No chest pain or shortness of breath.  No diarrhea or burning when he urinates.  No nausea or vomiting.    Narrative: This 51-year-old white male presents with abdominal pain.  The patient's son was diagnosed with C. difficile on Monday.  The patient has had a hemorrhoidectomy in the past and unremarkable colonoscopy.  The colonoscopy was October 22, 2018.    PCP:Thu Braun PA-C        History of Present Illness     Medication List      ASK your doctor about these medications    amLODIPine 10 MG tablet  Commonly known as: NORVASC  Take 1 tablet by mouth Daily.     aspirin 81 MG tablet     cetirizine 10 MG tablet  Commonly known as: zyrTEC  TAKE ONE TABLET BY MOUTH DAILY     fish oil 1000 MG capsule capsule     meloxicam 15 MG tablet  Commonly known as: MOBIC  TAKE ONE TABLET BY MOUTH DAILY     MULTI COMPLETE PO     Testosterone 20.25 MG/ACT (1.62%) gel  Apply 1 pump to bilateral shoulders once in AM.            Review of Systems   Constitutional: Negative for chills and fever.   HENT: Negative for ear pain and sore throat.    Respiratory: Negative for shortness of breath.    Cardiovascular: Negative for chest pain.   Gastrointestinal: Positive for abdominal pain. Negative for nausea.   Genitourinary: Negative for dysuria.   Musculoskeletal: Negative for back pain.   Skin: Negative for rash.   Neurological: Positive for headaches (Mild headache).       Past Medical History:   Diagnosis Date   • Chest pain    • Hypertension    • Lightheadedness    • MVP (mitral valve prolapse)    • Palpitations     • Sinus tachycardia    • Visual disturbances        No Known Allergies    Past Surgical History:   Procedure Laterality Date   • ADENOIDECTOMY     • CARPAL TUNNEL RELEASE     • COLONOSCOPY N/A 10/22/2018    Procedure: COLONOSCOPY TO CECUM;  Surgeon: Flaco Fountain MD;  Location: [facility] ENDOSCOPY;  Service: Gastroenterology   • HEMORROIDECTOMY     • TONSILLECTOMY         Family History   Problem Relation Age of Onset   • Stroke Father    • Stroke Paternal Uncle    • Hypertension Paternal Uncle    • Hypertension Paternal Uncle    • Suicide Attempts Mother         suicide   • No Known Problems Maternal Grandfather    • No Known Problems Paternal Grandmother    • No Known Problems Paternal Grandfather    • No Known Problems Other        Social History     Socioeconomic History   • Marital status:      Spouse name: Lizzy   • Number of children: 3   Tobacco Use   • Smoking status: Former Smoker     Packs/day: 0.50     Years: 15.00     Pack years: 7.50     Types: Cigarettes     Start date:      Quit date:      Years since quittin.7   • Smokeless tobacco: Never Used   Vaping Use   • Vaping Use: Never used   Substance and Sexual Activity   • Alcohol use: Yes     Alcohol/week: 7.0 standard drinks     Types: 7 Glasses of wine per week     Comment: 1 g/o red wine daily w/dinner   • Drug use: No   • Sexual activity: Yes     Partners: Female           Objective   Physical Exam  Vitals (The temperature is 100.9 °F, pulse 104, respirations 18, /94, room air pulse ox 96%.) and nursing note reviewed.   Constitutional:       Appearance: He is well-developed.   HENT:      Head: Normocephalic and atraumatic.      Mouth/Throat:      Mouth: Mucous membranes are moist.   Cardiovascular:      Rate and Rhythm: Normal rate and regular rhythm.      Heart sounds: No murmur heard.    No friction rub. No gallop.   Pulmonary:      Effort: Pulmonary effort is normal.      Breath sounds: Normal breath sounds.    Abdominal:      General: Bowel sounds are normal.      Palpations: Abdomen is soft. There is no mass.      Tenderness: There is abdominal tenderness (Mild diffuse). There is no guarding or rebound.      Hernia: No hernia is present.   Skin:     General: Skin is warm and dry.      Findings: No rash.   Neurological:      General: No focal deficit present.      Mental Status: He is alert and oriented to person, place, and time.         Procedures           ED Course  ED Course as of 09/10/22 1846   Sat Sep 10, 2022   1721 The urine microscopic shows 3-5 red blood cells, trace bacteria, urinalysis otherwise unremarkable.    The CMP is unremarkable. [RC]   1721 The CBC shows a relative neutrophil percent of 80%.  The absolute neutrophil count is 6.  The CBC is otherwise unremarkable [RC]      ED Course User Index  [RC] Theodore Kaiser MD    18:46 EDT, 09/10/22:  The patient was reassessed.  His vital signs reviewed and are stable abdominal exam: Soft, mild right lower quadrant tenderness, no rebound, no guarding, no masses, positive bowel sounds.    18:47 EDT, 09/10/22:  I spoke with Dr. Doty, on-call for general surgery, she will take the patient to surgery tonight.  She was also made aware that the patient will need follow-up for the enlarged seminal vesicle and prostate.    18:48 EDT, 09/10/22:  The patient's diagnosis of acute appendicitis and enlarged prostate and seminal vesicle were discussed with the patient and wife.  The plan to have the patient go to the operating room tonight to have the appendix removed was discussed with the patient.  The patient and wife were advised that the patient would need follow-up with urology for the findings of enlarged seminal vesicle and prostate.  All of their questions were answered the patient will go to the OR in stable condition.                                       MDM    Final diagnoses:   Acute appendicitis, unspecified acute appendicitis type   Enlarged  prostate       ED Disposition  ED Disposition     None          No follow-up provider specified.       Medication List      No changes were made to your prescriptions during this visit.          Theodore Kaiser MD  09/10/22 [id number]

## 2022-09-10 NOTE — ANESTHESIA PREPROCEDURE EVALUATION
Anesthesia Evaluation     Patient summary reviewed and Nursing notes reviewed   no history of anesthetic complications:  NPO Solid Status: > 8 hours  NPO Liquid Status: > 6 hours           Airway   Mallampati: II  TM distance: >3 FB  Neck ROM: full  No difficulty expected  Dental      Pulmonary     breath sounds clear to auscultation  (+) a smoker Former, sleep apnea on CPAP,   Cardiovascular   Exercise tolerance: good (4-7 METS)    PT is on anticoagulation therapy  Rhythm: regular  Rate: normal    (+) hypertension well controlled less than 2 medications, valvular problems/murmurs MVP,       Neuro/Psych  (+) headaches,    GI/Hepatic/Renal/Endo - negative ROS     Musculoskeletal (-) negative ROS    Abdominal    Substance History   (+) alcohol use (2 drinks per day),      OB/GYN          Other - negative ROS       ROS/Med Hx Other: BPH                  Anesthesia Plan    ASA 2 - emergent     general     intravenous induction     Anesthetic plan, risks, benefits, and alternatives have been provided, discussed and informed consent has been obtained with: patient.    Use of blood products discussed with patient  Consented to blood products.       CODE STATUS:

## 2022-09-10 NOTE — ED NOTES
Patient reports abd pain around umbilicus that does not radiate starting Thursday. Patient states that after abd pain started, low grade fever began as well - positively reactive to tylenol. Patient states after taking pepto and tylenol, pain was not alleviated. Patient reports son, that lives at home, had recent diagnosis of Cdiff. Patient denies NVD since abd pain started.

## 2022-09-10 NOTE — H&P
General Surgery      Patient Care Team:  Thu Braun PA-C as PCP - General (Family Medicine)    CHIEF COMPLAINT: Acute appendicitis    HISTORY OF PRESENT ILLNESS:    This very pleasant 51-year-old male developed periumbilical pain 2 days ago.  The pain progressively worsened.  It never migrated to the right lower quadrant Promit stayed in the periumbilical area.  He actually thought he had C. difficile because his son was recently diagnosed with C. difficile colitis.  He did not have any diarrhea associate with this.  He had no nausea or vomiting.  He did have some chills.  He does not smoke or use tobacco products.  He is very healthy male.  He presented to the ER he had a normal white count but a CT scan that showed early appendicitis.  He has no chest pain or shortness of breath.  He has no other complaints.      Past Medical History:   Diagnosis Date   • Chest pain    • Hypertension    • Lightheadedness    • MVP (mitral valve prolapse)    • Palpitations    • Sinus tachycardia    • Visual disturbances      Past Surgical History:   Procedure Laterality Date   • ADENOIDECTOMY     • CARPAL TUNNEL RELEASE     • COLONOSCOPY N/A 10/22/2018    Procedure: COLONOSCOPY TO CECUM;  Surgeon: Flaco Fountain MD;  Location: [facility] ENDOSCOPY;  Service: Gastroenterology   • HEMORROIDECTOMY     • TONSILLECTOMY       Family History   Problem Relation Age of Onset   • Stroke Father    • Stroke Paternal Uncle    • Hypertension Paternal Uncle    • Hypertension Paternal Uncle    • Suicide Attempts Mother         suicide   • No Known Problems Maternal Grandfather    • No Known Problems Paternal Grandmother    • No Known Problems Paternal Grandfather    • No Known Problems Other      Social History     Tobacco Use   • Smoking status: Former Smoker     Packs/day: 0.50     Years: 15.00     Pack years: 7.50     Types: Cigarettes     Start date:      Quit date: 2000     Years since quittin.7   • Smokeless tobacco: Never  "Used   Vaping Use   • Vaping Use: Never used   Substance Use Topics   • Alcohol use: Yes     Alcohol/week: 7.0 standard drinks     Types: 7 Glasses of wine per week     Comment: 1 g/o red wine daily w/dinner   • Drug use: No     Medications Prior to Admission   Medication Sig Dispense Refill Last Dose   • amLODIPine (NORVASC) 10 MG tablet Take 1 tablet by mouth Daily. 90 tablet 3 9/9/2022 at 1400   • aspirin 81 MG tablet Take 81 mg by mouth Daily.   9/9/2022 at 1400   • cetirizine (zyrTEC) 10 MG tablet TAKE ONE TABLET BY MOUTH DAILY 90 tablet 2 9/9/2022 at Unknown time   • Multiple Vitamins-Minerals (MULTI COMPLETE PO) Take  by mouth Daily.   9/9/2022 at 1400   • Testosterone 20.25 MG/ACT (1.62%) gel Apply 1 pump to bilateral shoulders once in AM. 75 g 5 9/10/2022 at 1000   • vitamin C (ASCORBIC ACID) 250 MG tablet Take 1,000 mg by mouth Daily.   9/9/2022 at 1400   • vitamin D3 125 MCG (5000 UT) capsule capsule Take 5,000 Units by mouth Daily.   9/9/2022 at 1400   • Omega-3 Fatty Acids (fish oil) 1000 MG capsule capsule Take  by mouth Daily With Breakfast.        Allergies:  Patient has no known allergies.    REVIEW OF SYSTEMS:  Please see the above history of present illness for pertinent positives and negatives.  The remainder of the patient's systems have been reviewed and are negative.     Vital Signs  Temp:  [100.3 °F (37.9 °C)-100.9 °F (38.3 °C)] 100.3 °F (37.9 °C)  Heart Rate:  [] 72  Resp:  [11-18] 11  BP: (116-145)/(77-95) 138/86    Flowsheet Rows    Flowsheet Row First Filed Value   Admission Height 188 cm (74\") Documented at 09/10/2022 1439   Admission Weight 99.3 kg (219 lb) Documented at 09/10/2022 1439           Physical Exam:  Physical Exam   Constitutional: Patient appears well-developed and well-nourished and in no acute distress   HEENT:   Head: Normocephalic and atraumatic.   Mouth and Throat: Patient has moist mucous membranes. Oropharynx is clear of any erythema or exudate.     Neck: Neck " supple. No JVD present. No thyromegaly present. No lymphadenopathy present.  Cardiovascular: Regular rate, regular rhythm.  Pulmonary/Chest: Lungs are clear to auscultation bilaterally.  Abdominal: Soft, hypoactive bowel sounds, right lower quadrant periumbilical tenderness to palpation but no guarding rebound or rigidity noted.  Musculoskeletal: Normal posture.  Extremities: No edema. No deformities noted  Neurological: Patient is alert and oriented.  Psychological:   Mood and behavior appropriate.  Skin: Skin is warm and dry.    Debilities/Disabilities Identified: None    Emotional Behavior: Appropriate           Results Review:    I reviewed the patient's new clinical results.  Lab Results (most recent)     Procedure Component Value Units Date/Time    Urinalysis, Microscopic Only - Urine, Clean Catch [[id number]]  (Abnormal) Collected: 09/10/22 1619    Specimen: Urine, Clean Catch Updated: 09/10/22 1706     RBC, UA 3-5 /HPF      WBC, UA None Seen /HPF      Bacteria, UA Trace /HPF      Squamous Epithelial Cells, UA 0-2 /HPF      Hyaline Casts, UA None Seen /LPF      Mucus, UA Trace /HPF      Methodology Manual Light Microscopy    Comprehensive Metabolic Panel [[id number]]  (Abnormal) Collected: 09/10/22 1612    Specimen: Blood Updated: 09/10/22 1701     Glucose 111 mg/dL      BUN 15 mg/dL      Creatinine 1.24 mg/dL      Sodium 138 mmol/L      Potassium 3.8 mmol/L      Chloride 105 mmol/L      CO2 25.2 mmol/L      Calcium 9.8 mg/dL      Total Protein 6.6 g/dL      Albumin 4.00 g/dL      ALT (SGPT) 22 U/L      AST (SGOT) 21 U/L      Alkaline Phosphatase 69 U/L      Total Bilirubin 0.4 mg/dL      Globulin 2.6 gm/dL      A/G Ratio 1.5 g/dL      BUN/Creatinine Ratio 12.1     Anion Gap 7.8 mmol/L      eGFR 70.8 mL/min/1.73      Comment: National Kidney Foundation and American Society of Nephrology (ASN) Task Force recommended calculation based on the Chronic Kidney Disease Epidemiology Collaboration (CKD-EPI) equation  refit without adjustment for race.       Narrative:      GFR Normal >60  Chronic Kidney Disease <60  Kidney Failure <15      Lipase [[id number]]  (Normal) Collected: 09/10/22 1612    Specimen: Blood Updated: 09/10/22 1701     Lipase 27 U/L     Urinalysis With Microscopic If Indicated (No Culture) - Urine, Clean Catch [[id number]]  (Abnormal) Collected: 09/10/22 1619    Specimen: Urine, Clean Catch Updated: 09/10/22 1650     Color, UA Yellow     Appearance, UA Clear     pH, UA 7.0     Specific Gravity, UA 1.020     Glucose, UA Negative     Ketones, UA Negative     Bilirubin, UA Negative     Blood, UA Small (1+)     Protein, UA Negative     Leuk Esterase, UA Negative     Nitrite, UA Negative     Urobilinogen, UA 0.2 E.U./dL    CBC & Differential [[id number]]  (Abnormal) Collected: 09/10/22 1612    Specimen: Blood Updated: 09/10/22 1636    Narrative:      The following orders were created for panel order CBC & Differential.  Procedure                               Abnormality         Status                     ---------                               -----------         ------                     CBC Auto Differential[[id number]]        Abnormal            Final result                 Please view results for these tests on the individual orders.    CBC Auto Differential [[id number]]  (Abnormal) Collected: 09/10/22 1612    Specimen: Blood Updated: 09/10/22 1636     WBC 7.68 10*3/mm3      RBC 5.33 10*6/mm3      Hemoglobin 16.5 g/dL      Hematocrit 50.0 %      MCV 93.8 fL      MCH 31.0 pg      MCHC 33.0 g/dL      RDW 13.5 %      RDW-SD 46.6 fl      MPV 10.5 fL      Platelets 207 10*3/mm3      Neutrophil % 80.0 %      Lymphocyte % 7.0 %      Monocyte % 12.4 %      Eosinophil % 0.0 %      Basophil % 0.3 %      Immature Grans % 0.3 %      Neutrophils, Absolute 6.15 10*3/mm3      Lymphocytes, Absolute 0.54 10*3/mm3      Monocytes, Absolute 0.95 10*3/mm3      Eosinophils, Absolute 0.00 10*3/mm3      Basophils, Absolute 0.02 10*3/mm3       Immature Grans, Absolute 0.02 10*3/mm3      nRBC 0.0 /100 WBC           Imaging Results (Most Recent)     Procedure Component Value Units Date/Time    CT Abdomen Pelvis With Contrast [[id number]] Collected: 09/10/22 1834     Updated: 09/10/22 1841    Narrative:      INDICATION:   Diffuse abdominal pain for 3 days with fever.    TECHNIQUE:   CT of the abdomen and pelvis during intravenous contrast. Dose recorded in the patient's chart. Coronal and sagittal reconstructions were obtained.  Radiation dose reduction techniques included automated exposure control or exposure modulation based on  body size. Radiation audit for number of CT and nuclear cardiology exams performed in the last year: 0.      COMPARISON:   None available.    FINDINGS:  Lung bases: Dependent atelectasis and mild breathing motion.    Abdomen: The liver, gallbladder, spleen, adrenal glands, pancreas and kidneys are within normal limits. There is no abdominal aortic aneurysm. There are mild atherosclerotic vascular calcifications of the abdominal aorta. There is no retroperitoneal  lymphadenopathy.    There is an abnormal appearance the appendix. It is mildly thickened and there is some periappendiceal stranding. Findings are concerning for early acute appendicitis. There is no evidence for bowel obstruction or free air or free fluid. There are a few  sigmoid diverticula without CT evidence for diverticulitis.        Pelvis: The bladder is unremarkable. The prostate gland is enlarged particularly for age group and further evaluation is recommended to exclude intrinsic prostate pathology. It measures about 5.9 cm diameter and is heterogeneous. There is asymmetric  enlargement of the left seminal vesicle also.  follow-up recommended.      Impression:        1. Findings are concerning for early acute appendicitis with subtle stranding and thickening of the appendix. Please correlate further clinically.     NOTIFICATION: Critical Value/emergent  results were called by telephone at the time of interpretation on 9/10/2022 6:32 PM to MD Job who verbally acknowledged these results.      2. Prostate gland is heterogeneous and enlarged particularly for age group and is also some asymmetric enlargement of the left-sided seminal vesicle aerated urology follow-up recommended to evaluate for possible intrinsic prostate gland or seminal  vesicle pathology.        Signer Name: Yvonne De La Fuente MD   Signed: 9/10/2022 6:34 PM   [email or web address]    [facility]        reviewed    ECG/EMG Results (most recent)     None            Assessment & Plan     Acute appendicitis    I discussed with Adan and his wife the benefits risks of a laparoscopic appendectomy.  Benefits risk not limited to including: Bleeding, infection, hernia formation, having virtual procedure, injury to normal structures, anesthesia complications.  They both appear to understand and are willing to proceed.      Lakshmi Doty DO  09/10/22  19:53 EDT

## 2022-09-11 ENCOUNTER — READMISSION MANAGEMENT (OUTPATIENT)
Dept: CALL CENTER | Facility: HOSPITAL | Age: 52
End: 2022-09-11

## 2022-09-11 VITALS
RESPIRATION RATE: 16 BRPM | BODY MASS INDEX: 28.11 KG/M2 | OXYGEN SATURATION: 96 % | DIASTOLIC BLOOD PRESSURE: 67 MMHG | HEART RATE: 54 BPM | HEIGHT: 74 IN | TEMPERATURE: 97.3 F | WEIGHT: 219 LBS | SYSTOLIC BLOOD PRESSURE: 122 MMHG

## 2022-09-11 LAB
ANION GAP SERPL CALCULATED.3IONS-SCNC: 7.4 MMOL/L (ref 5–15)
BASOPHILS # BLD AUTO: 0.01 10*3/MM3 (ref 0–0.2)
BASOPHILS NFR BLD AUTO: 0.2 % (ref 0–1.5)
BUN SERPL-MCNC: 15 MG/DL (ref 6–20)
BUN/CREAT SERPL: 13.5 (ref 7–25)
CALCIUM SPEC-SCNC: 9.2 MG/DL (ref 8.6–10.5)
CHLORIDE SERPL-SCNC: 102 MMOL/L (ref 98–107)
CO2 SERPL-SCNC: 26.6 MMOL/L (ref 22–29)
CREAT SERPL-MCNC: 1.11 MG/DL (ref 0.76–1.27)
DEPRECATED RDW RBC AUTO: 46.2 FL (ref 37–54)
EGFRCR SERPLBLD CKD-EPI 2021: 80.9 ML/MIN/1.73
EOSINOPHIL # BLD AUTO: 0 10*3/MM3 (ref 0–0.4)
EOSINOPHIL NFR BLD AUTO: 0 % (ref 0.3–6.2)
ERYTHROCYTE [DISTWIDTH] IN BLOOD BY AUTOMATED COUNT: 13.3 % (ref 12.3–15.4)
GLUCOSE SERPL-MCNC: 148 MG/DL (ref 65–99)
HCT VFR BLD AUTO: 46 % (ref 37.5–51)
HGB BLD-MCNC: 15.2 G/DL (ref 13–17.7)
IMM GRANULOCYTES # BLD AUTO: 0.02 10*3/MM3 (ref 0–0.05)
IMM GRANULOCYTES NFR BLD AUTO: 0.3 % (ref 0–0.5)
LYMPHOCYTES # BLD AUTO: 0.56 10*3/MM3 (ref 0.7–3.1)
LYMPHOCYTES NFR BLD AUTO: 9.2 % (ref 19.6–45.3)
MCH RBC QN AUTO: 31 PG (ref 26.6–33)
MCHC RBC AUTO-ENTMCNC: 33 G/DL (ref 31.5–35.7)
MCV RBC AUTO: 93.7 FL (ref 79–97)
MONOCYTES # BLD AUTO: 0.19 10*3/MM3 (ref 0.1–0.9)
MONOCYTES NFR BLD AUTO: 3.1 % (ref 5–12)
NEUTROPHILS NFR BLD AUTO: 5.32 10*3/MM3 (ref 1.7–7)
NEUTROPHILS NFR BLD AUTO: 87.2 % (ref 42.7–76)
NRBC BLD AUTO-RTO: 0 /100 WBC (ref 0–0.2)
PLATELET # BLD AUTO: 194 10*3/MM3 (ref 140–450)
PMV BLD AUTO: 10.5 FL (ref 6–12)
POTASSIUM SERPL-SCNC: 4 MMOL/L (ref 3.5–5.2)
RBC # BLD AUTO: 4.91 10*6/MM3 (ref 4.14–5.8)
SODIUM SERPL-SCNC: 136 MMOL/L (ref 136–145)
WBC NRBC COR # BLD: 6.1 10*3/MM3 (ref 3.4–10.8)

## 2022-09-11 PROCEDURE — 25010000002 PIPERACILLIN SOD-TAZOBACTAM PER 1 G

## 2022-09-11 PROCEDURE — 85025 COMPLETE CBC W/AUTO DIFF WBC: CPT | Performed by: SURGERY

## 2022-09-11 PROCEDURE — 80048 BASIC METABOLIC PNL TOTAL CA: CPT | Performed by: SURGERY

## 2022-09-11 PROCEDURE — G0378 HOSPITAL OBSERVATION PER HR: HCPCS

## 2022-09-11 PROCEDURE — 25010000002 PIPERACILLIN SOD-TAZOBACTAM PER 1 G: Performed by: SURGERY

## 2022-09-11 RX ORDER — SODIUM CHLORIDE 9 MG/ML
INJECTION, SOLUTION INTRAVENOUS
Status: DISCONTINUED
Start: 2022-09-11 | End: 2022-09-11 | Stop reason: HOSPADM

## 2022-09-11 RX ORDER — HYDROCODONE BITARTRATE AND ACETAMINOPHEN 5; 325 MG/1; MG/1
1 TABLET ORAL EVERY 4 HOURS PRN
Qty: 30 TABLET | Refills: 0 | Status: SHIPPED | OUTPATIENT
Start: 2022-09-11 | End: 2022-09-28

## 2022-09-11 RX ORDER — PIPERACILLIN SODIUM, TAZOBACTAM SODIUM 3; .375 G/15ML; G/15ML
INJECTION, POWDER, LYOPHILIZED, FOR SOLUTION INTRAVENOUS
Status: DISCONTINUED
Start: 2022-09-11 | End: 2022-09-11 | Stop reason: HOSPADM

## 2022-09-11 RX ADMIN — PIPERACILLIN AND TAZOBACTAM 3.38 G: 3; .375 INJECTION, POWDER, FOR SOLUTION INTRAVENOUS at 02:52

## 2022-09-11 RX ADMIN — PIPERACILLIN AND TAZOBACTAM 3.38 G: 3; .375 INJECTION, POWDER, FOR SOLUTION INTRAVENOUS at 10:12

## 2022-09-11 RX ADMIN — HYDROCODONE BITARTRATE AND ACETAMINOPHEN 1 TABLET: 5; 325 TABLET ORAL at 13:46

## 2022-09-11 RX ADMIN — HYDROCODONE BITARTRATE AND ACETAMINOPHEN 1 TABLET: 5; 325 TABLET ORAL at 05:56

## 2022-09-11 NOTE — ANESTHESIA POSTPROCEDURE EVALUATION
Patient: Adan Chauhan    Procedure Summary     Date: 09/10/22 Room / Location:  [facility] OR 2 /  [facility] OR    Anesthesia Start: 2015 Anesthesia Stop: 2131    Procedure: APPENDECTOMY LAPAROSCOPIC (N/A Abdomen) Diagnosis: (ACUTE APPENDICITIS)    Surgeons: Lakshmi Doty DO Provider: Alie Araya CRNA    Anesthesia Type: general ASA Status: 2 - Emergent          Anesthesia Type: general    Vitals  Vitals Value Taken Time   /55 09/10/22 2155   Temp 99.8 °F (37.7 °C) 09/10/22 2131   Pulse 60 09/10/22 2200   Resp 22 09/10/22 2150   SpO2 94 % 09/10/22 2200   Vitals shown include unvalidated device data.        Post Anesthesia Care and Evaluation    Patient location during evaluation: PHASE II  Patient participation: complete - patient participated  Level of consciousness: awake  Pain management: adequate    Airway patency: patent  Anesthetic complications: No anesthetic complications  PONV Status: noneRespiratory status: acceptable  Hydration status: acceptable

## 2022-09-11 NOTE — NURSING NOTE
Patient ambulated to the bathroom independently. Patient urinated in the toilet without any difficulty. Patient stated he passed gas. States pain is 4/10 stable. Does not need any pain medications at this time. Patient states he is getting an appetite and is ready for food. RN advanced diet as tolerated per patient request.

## 2022-09-11 NOTE — OUTREACH NOTE
Prep Survey    Flowsheet Row Responses   [facility], [place], operated by [facility] patient discharged from? [place]   Is LACE score < 7 ? Yes   Emergency Room discharge w/ pulse ox? No   Eligibility [facility]   Date of Admission 09/10/22   Date of Discharge 09/11/22   Discharge Disposition Home or Self Care   Discharge diagnosis laparoscopic appendectomy   Does the patient have one of the following disease processes/diagnoses(primary or secondary)? General Surgery   Does the patient have Home health ordered? No   Is there a DME ordered? No   Prep survey completed? Yes          TY BUCHANAN - Registered Nurse

## 2022-09-11 NOTE — OP NOTE
PREOPERATIVE DIAGNOSIS:  Acute Appendicitis  POSTOPERATIVE DIAGNOSIS:  Acute suppurative appendicitis   PROCEDURE:  Laparoscopic Appendectomy  SURGEON/STAFF:  Lalitha  ASSISTANT:  Assistant: Paras Johnson CSA was responsible for performing the following activities: Retraction, Suturing, Closing and Placing Dressing and their skilled assistance was necessary for the success of this case.    ANESTHESIA:  General.   BLOOD LOSS: 10 ml  COMPLICATIONS:  none  COUNTS:  Needle and sponge count correct.  SPECIMENS:  Appendix    INDICATIONS FOR OPERATION:  Adan Chauhan is a 51 y.o. year old male who presented to to the ED for evaluation of acute appendicitis.    On physical exam, he   was seen to have acute appendicitis.   The risks and benefits of open, conservative and laparoscopic management were discussed at length and in detail with the patient.  he has chosen to undergo laparoscopic repair.     OPERATION:  The patient was brought to the operating room in stable condition.   Preoperative antibiotics were given and sequential compression devices were applied.  At this time, the patient was laid supine on the operating room table.  General anesthesia was induced by the Anesthesia service without difficulty.  The patient's abdomen was prepped and draped in the usual sterile fashion.      At this time, the patient's abdomen was accessed at the level of the umbilicus with an open cutdown technique and insertion of a 12 mm trocar.  The abdomen was insufflated.  Brief survey of the abdominal cavity revealed no intra-abdominal injury, and an inflamed appendix.  The operation was continued by insertion of 2 additional 5 mm trocars under direct vision.   The appendix was grasped and retracted up.  After using the harmonic to transect the mesentery of the appendix, and subsequently control and divide the appendiceal artery the base of the appendix was inspected.  Allardt stapler was then clamped across the base the  appendix and fired.  The appendix was then removed through the umbilical port site with the aid of an endo catch bag.    Next, the abdomen was inspected and irrigated.  The field was completely clean with no evidence of intra-abdominal injury or bleeding.  All trocars were then removed under direct vision.  The umbilical fascia was closed with Vicryl suture.  All skin incisions were closed with 4-0 Vicryl and Indermil glue.  Trevon then had his anesthesia reversed, his ET tube removed, and he was taken to the recovery area in stable postoperative condition having tolerated his procedure well.    KAYLEN Doty DO

## 2022-09-11 NOTE — NURSING NOTE
Patient arrived to 1411 from PACU. Patient oriented to room and staff, call light with in reach. Wife at bedside.    Vital signs stable, states pain is 4/10 (stable). Abdominal lap sites x3 gauze and tegaderm CDI

## 2022-09-11 NOTE — CASE MANAGEMENT/SOCIAL WORK
Case Management Discharge Note      Final Note: dc home    Provided Post Acute Provider List?: N/A  Provided Post Acute Provider Quality & Resource List?: N/A    Selected Continued Care - Discharged on 9/11/2022 Admission date: 9/10/2022 - Discharge disposition: Home or Self Care    Destination    No services have been selected for the patient.              Durable Medical Equipment    No services have been selected for the patient.              Dialysis/Infusion    No services have been selected for the patient.              Home Medical Care    No services have been selected for the patient.              Therapy    No services have been selected for the patient.              Community Resources    No services have been selected for the patient.              Community & DME    No services have been selected for the patient.                       Final Discharge Disposition Code: 01 - home or self-care

## 2022-09-11 NOTE — PLAN OF CARE
Goal Outcome Evaluation:  Plan of Care Reviewed With: patient        Progress: improving       Patient doing well post op lap appy. He did receive one dose of dilaudid prior to going to bed. He ambulated in room with minimal assistance. Hoping to go home today. Tolerating a clear liquid diet. Diet advanced to regular as per order.    3 lap sites CDI.

## 2022-09-11 NOTE — CASE MANAGEMENT/SOCIAL WORK
Continued Stay Note  WOO Hernandez     Patient Name: Adan Chauhan  MRN: [id number]  Today's Date: 9/11/2022    Admit Date: 9/10/2022     Discharge Plan     Row Name 09/11/22 1326       Plan    Plan plan home w family    Patient/Family in Agreement with Plan yes    Provided Post Acute Provider List? N/A    Provided Post Acute Provider Quality & Resource List? N/A    Plan Comments Spoke with patient at bedside, permission to speak with wife present. Face sheet verified. Patient lives in a home with his wife and children ages 10, 14 & 18 years old. He is independent of ADLs including driving and working. He uses a cpap and denies additional DME. He has not used HH or inpt rehab previously. He has a living will on file. He sees Thu QUIGLEY as PCP. He uses [facility] [place] and denies issues obtaining medications.. He plans to dc home with family and does not anticipate any needs. CM# placed on white board, will continue to follow.               Discharge Codes    No documentation.               Expected Discharge Date and Time     Expected Discharge Date Expected Discharge Time    Sep 11, 2022             Filipe Sam RN

## 2022-09-11 NOTE — ANESTHESIA PROCEDURE NOTES
Airway  Urgency: elective    Date/Time: 9/10/2022 8:21 PM  End Time:9/10/2022 8:21 PM  Airway not difficult    General Information and Staff    Patient location during procedure: OR  CRNA/CAA: Alie Araya CRNA    Indications and Patient Condition  Indications for airway management: airway protection    Preoxygenated: yes  MILS maintained throughout  Mask difficulty assessment: 0 - not attempted (rsi)    Final Airway Details  Final airway type: endotracheal airway      Successful airway: ETT  Cuffed: yes   Successful intubation technique: direct laryngoscopy  Facilitating devices/methods: intubating stylet and cricoid pressure  Endotracheal tube insertion site: oral  Blade: Erazo  Blade size: 2  ETT size (mm): 7.5  Cormack-Lehane Classification: grade IIa - partial view of glottis  Placement verified by: chest auscultation and capnometry   Measured from: lips  ETT/EBT  to lips (cm): 24  Number of attempts at approach: 1  Assessment: lips, teeth, and gum same as pre-op and atraumatic intubation

## 2022-09-11 NOTE — DISCHARGE SUMMARY
Discharge Summary    Patient name: Adan Chauhan    Medical record number: [id number]    Admission date: 9/10/2022  Discharge date:  9/11/2022    Attending physician: Dr. KAYLEN Doty    Primary care physician: Thu Braun PA-C    Referring physician: No referring provider defined for this encounter.    Consulting physician(s):    Condition on discharge: stable    Primary Diagnoses:  Acute appendicitis     Secondary Diagnoses: Enlarged prostate    Operative Procedure: laparoscopic appendectomy    Hospital Course: The patient is a very pleasant 51 y.o. male that was admitted to the hospital after his laparoscopic appendectomy.  By POD 1 he was tolerating a regular diet and his pain was well controlled.  His dressings were intact.  We discussed calling to get an appointment with Prasanth Gates MD for his enlarged prostate.      Discharge medications:      Discharge Medications      New Medications      Instructions Start Date   HYDROcodone-acetaminophen 5-325 MG per tablet  Commonly known as: NORCO   1 tablet, Oral, Every 4 Hours PRN         Continue These Medications      Instructions Start Date   amLODIPine 10 MG tablet  Commonly known as: NORVASC   10 mg, Oral, Daily      aspirin 81 MG tablet   81 mg, Oral, Daily      cetirizine 10 MG tablet  Commonly known as: zyrTEC   TAKE ONE TABLET BY MOUTH DAILY      fish oil 1000 MG capsule capsule   Oral, Daily With Breakfast      MULTI COMPLETE PO   Oral, Daily      Testosterone 20.25 MG/ACT (1.62%) gel   Apply 1 pump to bilateral shoulders once in AM.      vitamin C 250 MG tablet  Commonly known as: ASCORBIC ACID   1,000 mg, Oral, Daily      vitamin D3 125 MCG (5000 UT) capsule capsule   5,000 Units, Oral, Daily             Discharge instructions:    You may walk as tolerated  You may walk up and down stairs as tolerated  In 48 hours take a shower and remove your outer dressings  No lifting greater than 10 pounds for 2 weeks  If you develop constipation, take a  tablespoon of Milk of Magnesia once a day as needed  You may place ice packs on your incisions for 20 minutes at a time as needed for pain    You may take ibuprofen as directed for additional pain control    Follow-up appointment: Follow up with Dr. Doty in the office in 2 weeks. Call for appointment at [phone].

## 2022-09-12 ENCOUNTER — TRANSITIONAL CARE MANAGEMENT TELEPHONE ENCOUNTER (OUTPATIENT)
Dept: CALL CENTER | Facility: HOSPITAL | Age: 52
End: 2022-09-12

## 2022-09-12 NOTE — OUTREACH NOTE
Call Center TCM Note    Flowsheet Row Responses   [facility] patient discharged from? [place]   Does the patient have one of the following disease processes/diagnoses(primary or secondary)? General Surgery   TCM attempt successful? Yes   Call start time 1516   Call end time 1517   Discharge diagnosis laparoscopic appendectomy   Is patient permission given to speak with other caregiver? Yes   List who call center can speak with spouse- Lizzy   Person spoke with today (if not patient) and relationship spouse   Does the patient have all medications related to this admission filled (includes all antibiotics, pain medications, etc.) Yes   Is the patient taking all medications as directed (includes completed medication regime)? Yes   Comments patient will be following up with his surgeon   Has home health visited the patient within 72 hours of discharge? N/A   Psychosocial issues? No   Did the patient receive a copy of their discharge instructions? Yes   What is the patient's perception of their health status since discharge? Improving   Nursing interventions Nurse provided patient education  [advised to follow discharge instructions]   Is the patient/caregiver able to teach back signs and symptoms of incisional infection? Fever   Is the patient/caregiver able to teach back the hierarchy of who to call/visit for symptoms/problems? PCP, Specialist, Home health nurse, Urgent Care, ED, 911 Yes   TCM call completed? Yes   Wrap up additional comments Per spouse, patient is doing well, he will be following up with his surgeon.          Janny Vicente RN    9/12/2022, 15:18 EDT

## 2022-09-14 LAB
LAB AP CASE REPORT: NORMAL
PATH REPORT.FINAL DX SPEC: NORMAL
PATH REPORT.GROSS SPEC: NORMAL

## 2022-09-15 ENCOUNTER — TRANSCRIBE ORDERS (OUTPATIENT)
Dept: ADMINISTRATIVE | Facility: HOSPITAL | Age: 52
End: 2022-09-15

## 2022-09-15 DIAGNOSIS — N13.8 BPH WITH URINARY OBSTRUCTION: Primary | ICD-10-CM

## 2022-09-15 DIAGNOSIS — N40.1 BPH WITH URINARY OBSTRUCTION: Primary | ICD-10-CM

## 2022-09-28 ENCOUNTER — OFFICE VISIT (OUTPATIENT)
Dept: SURGERY | Facility: CLINIC | Age: 52
End: 2022-09-28

## 2022-09-28 DIAGNOSIS — Z90.49 STATUS POST LAPAROSCOPIC APPENDECTOMY: Primary | ICD-10-CM

## 2022-09-28 PROCEDURE — 99024 POSTOP FOLLOW-UP VISIT: CPT | Performed by: SURGERY

## 2022-09-28 NOTE — PROGRESS NOTES
Adan Chauhan 51 y.o. male presents for PO FU Lap Appy.  Pt feels well and would like to discuss restarting exercise.      HPI     Above noted and agree.    Review of Systems        Past Medical History:   Diagnosis Date   • Chest pain    • Hypertension    • Lightheadedness    • MVP (mitral valve prolapse)    • Palpitations    • Sinus tachycardia    • Visual disturbances            Past Surgical History:   Procedure Laterality Date   • ADENOIDECTOMY     • APPENDECTOMY N/A 9/10/2022    Procedure: APPENDECTOMY LAPAROSCOPIC;  Surgeon: Lakshmi Doty DO;  Location:  [facility];  Service: General;  Laterality: N/A;  LAPAROSCOPIC APPENDECTOMY   • CARPAL TUNNEL RELEASE     • COLONOSCOPY N/A 10/22/2018    Procedure: COLONOSCOPY TO CECUM;  Surgeon: Flaco Fountain MD;  Location:  [facility];  Service: Gastroenterology   • HEMORROIDECTOMY     • TONSILLECTOMY             Physical Exam    General:  Awake and alert with no acute distress  Eyes:  No icterus  Abdomen:  Soft, non-tender  Incision:  Clean, dry, intact    There were no vitals taken for this visit.        Diagnoses and all orders for this visit:    1. Status post laparoscopic appendectomy (Primary)    We discussed restarting cardio now but waiting another 4 weeks before starting any core workouts or heavy lifting.    Thank you for allowing me to participate in the care of this interesting patient.

## 2022-10-18 ENCOUNTER — APPOINTMENT (OUTPATIENT)
Dept: MRI IMAGING | Facility: HOSPITAL | Age: 52
End: 2022-10-18

## 2022-11-02 DIAGNOSIS — Z12.5 SCREENING FOR MALIGNANT NEOPLASM OF PROSTATE: ICD-10-CM

## 2022-11-02 DIAGNOSIS — I10 ESSENTIAL HYPERTENSION: ICD-10-CM

## 2022-11-02 DIAGNOSIS — Z00.00 ANNUAL PHYSICAL EXAM: Primary | ICD-10-CM

## 2022-11-05 LAB
ALBUMIN SERPL-MCNC: 4.4 G/DL (ref 3.5–5.2)
ALBUMIN/GLOB SERPL: 2.2 G/DL
ALP SERPL-CCNC: 65 U/L (ref 39–117)
ALT SERPL-CCNC: 18 U/L (ref 1–41)
AST SERPL-CCNC: 17 U/L (ref 1–40)
BASOPHILS # BLD AUTO: 0.03 10*3/MM3 (ref 0–0.2)
BASOPHILS NFR BLD AUTO: 0.4 % (ref 0–1.5)
BILIRUB SERPL-MCNC: 0.6 MG/DL (ref 0–1.2)
BUN SERPL-MCNC: 20 MG/DL (ref 6–20)
BUN/CREAT SERPL: 18.7 (ref 7–25)
CALCIUM SERPL-MCNC: 10.5 MG/DL (ref 8.6–10.5)
CHLORIDE SERPL-SCNC: 104 MMOL/L (ref 98–107)
CHOLEST SERPL-MCNC: 204 MG/DL (ref 0–200)
CO2 SERPL-SCNC: 30 MMOL/L (ref 22–29)
CREAT SERPL-MCNC: 1.07 MG/DL (ref 0.76–1.27)
EGFRCR SERPLBLD CKD-EPI 2021: 84 ML/MIN/1.73
EOSINOPHIL # BLD AUTO: 0.16 10*3/MM3 (ref 0–0.4)
EOSINOPHIL NFR BLD AUTO: 2.2 % (ref 0.3–6.2)
ERYTHROCYTE [DISTWIDTH] IN BLOOD BY AUTOMATED COUNT: 12.8 % (ref 12.3–15.4)
GLOBULIN SER CALC-MCNC: 2 GM/DL
GLUCOSE SERPL-MCNC: 89 MG/DL (ref 65–99)
HCT VFR BLD AUTO: 43.9 % (ref 37.5–51)
HDLC SERPL-MCNC: 59 MG/DL (ref 40–60)
HGB BLD-MCNC: 15.3 G/DL (ref 13–17.7)
IMM GRANULOCYTES # BLD AUTO: 0.01 10*3/MM3 (ref 0–0.05)
IMM GRANULOCYTES NFR BLD AUTO: 0.1 % (ref 0–0.5)
LDLC SERPL CALC-MCNC: 126 MG/DL (ref 0–100)
LDLC/HDLC SERPL: 2.09 {RATIO}
LYMPHOCYTES # BLD AUTO: 2.25 10*3/MM3 (ref 0.7–3.1)
LYMPHOCYTES NFR BLD AUTO: 31.2 % (ref 19.6–45.3)
MCH RBC QN AUTO: 30.7 PG (ref 26.6–33)
MCHC RBC AUTO-ENTMCNC: 34.9 G/DL (ref 31.5–35.7)
MCV RBC AUTO: 88.2 FL (ref 79–97)
MONOCYTES # BLD AUTO: 0.62 10*3/MM3 (ref 0.1–0.9)
MONOCYTES NFR BLD AUTO: 8.6 % (ref 5–12)
NEUTROPHILS # BLD AUTO: 4.14 10*3/MM3 (ref 1.7–7)
NEUTROPHILS NFR BLD AUTO: 57.5 % (ref 42.7–76)
NRBC BLD AUTO-RTO: 0 /100 WBC (ref 0–0.2)
PLATELET # BLD AUTO: 281 10*3/MM3 (ref 140–450)
POTASSIUM SERPL-SCNC: 4.2 MMOL/L (ref 3.5–5.2)
PROT SERPL-MCNC: 6.4 G/DL (ref 6–8.5)
PSA SERPL-MCNC: 3.52 NG/ML (ref 0–4)
RBC # BLD AUTO: 4.98 10*6/MM3 (ref 4.14–5.8)
SODIUM SERPL-SCNC: 143 MMOL/L (ref 136–145)
TRIGL SERPL-MCNC: 108 MG/DL (ref 0–150)
VLDLC SERPL CALC-MCNC: 19 MG/DL (ref 5–40)
WBC # BLD AUTO: 7.21 10*3/MM3 (ref 3.4–10.8)

## 2022-11-11 ENCOUNTER — OFFICE VISIT (OUTPATIENT)
Dept: FAMILY MEDICINE CLINIC | Facility: CLINIC | Age: 52
End: 2022-11-11

## 2022-11-11 VITALS
WEIGHT: 225.6 LBS | SYSTOLIC BLOOD PRESSURE: 110 MMHG | HEART RATE: 74 BPM | HEIGHT: 74 IN | TEMPERATURE: 98 F | OXYGEN SATURATION: 96 % | RESPIRATION RATE: 14 BRPM | BODY MASS INDEX: 28.95 KG/M2 | DIASTOLIC BLOOD PRESSURE: 70 MMHG

## 2022-11-11 DIAGNOSIS — E78.00 HYPERCHOLESTEREMIA: ICD-10-CM

## 2022-11-11 DIAGNOSIS — Z23 NEED FOR INFLUENZA VACCINATION: ICD-10-CM

## 2022-11-11 DIAGNOSIS — I10 ESSENTIAL HYPERTENSION: ICD-10-CM

## 2022-11-11 DIAGNOSIS — Z00.00 ANNUAL PHYSICAL EXAM: Primary | ICD-10-CM

## 2022-11-11 DIAGNOSIS — R93.5 ABNORMAL MRI OF ABDOMEN: ICD-10-CM

## 2022-11-11 PROCEDURE — 90471 IMMUNIZATION ADMIN: CPT | Performed by: PHYSICIAN ASSISTANT

## 2022-11-11 PROCEDURE — 90686 IIV4 VACC NO PRSV 0.5 ML IM: CPT | Performed by: PHYSICIAN ASSISTANT

## 2022-11-11 PROCEDURE — 99396 PREV VISIT EST AGE 40-64: CPT | Performed by: PHYSICIAN ASSISTANT

## 2022-11-11 RX ORDER — AMLODIPINE BESYLATE 10 MG/1
10 TABLET ORAL DAILY
Qty: 90 TABLET | Refills: 3 | Status: SHIPPED | OUTPATIENT
Start: 2022-11-11

## 2022-11-11 NOTE — PROGRESS NOTES
"Chief Complaint  Annual Exam, Hypertension (management), and Hyperlipidemia (management)    Subjective          Adan Chauhan presents to [facility]  History of Present Illness  Adan is a 52-year-old male who presents for annual physical examination, hypertension and hypercholesterolemia management.  Recently had an acute appendicitis attack and had surgery at [facility].  During the CT scan during ER visit, he was noted to have enlarged prostate with prostate lesion.  He has since seen urologist for this.  Had recent MRI of prostate that showed bilateral small inguinal hernias.  He states he has no pain in his groin area.  Has not noticed any hernias with bowel movements.  He does do some weight lifting but has not been weight lifting since his surgery and finding out about the hernias.  His bowel movements have been normal without dark black tarry stools.  His sleep has been normal.  Appetite has been fairly healthy.  Denied any current fevers, chills, upper respiratory symptoms, chest pain, shortness of air, cough, wheezing, abdominal pain, GI upset, urinary symptoms or swelling of ankles.  Would like flu shot today.  Review of Systems   Constitutional: Negative.    HENT: Negative.    Eyes: Negative.    Respiratory: Negative.    Cardiovascular: Negative.    Gastrointestinal: Negative.    Endocrine: Negative.    Genitourinary: Negative.    Musculoskeletal: Negative.    Skin: Negative.    Allergic/Immunologic: Negative.    Neurological: Negative.    Hematological: Negative.    Psychiatric/Behavioral: Negative.    All other systems reviewed and are negative.    Objective   Vital Signs:   /70 (BP Location: Left arm, Patient Position: Sitting, Cuff Size: Adult)   Pulse 74   Temp 98 °F (36.7 °C) (Infrared)   Resp 14   Ht 188 cm (74\")   Wt 102 kg (225 lb 9.6 oz)   SpO2 96%   BMI 28.97 kg/m²     Physical Exam  Vitals and nursing note reviewed. Exam conducted " with a chaperone present.   Constitutional:       Appearance: Normal appearance. He is well-developed, well-groomed and overweight.      Interventions: Face mask in place.   HENT:      Head: Normocephalic and atraumatic.      Jaw: There is normal jaw occlusion.      Right Ear: Hearing, tympanic membrane, ear canal and external ear normal.      Left Ear: Hearing, tympanic membrane, ear canal and external ear normal.      Nose: Nose normal.      Right Sinus: No maxillary sinus tenderness or frontal sinus tenderness.      Left Sinus: No maxillary sinus tenderness or frontal sinus tenderness.      Mouth/Throat:      Lips: Pink.      Mouth: Mucous membranes are moist.      Tongue: No lesions.      Palate: No mass and lesions.      Pharynx: Oropharynx is clear. Uvula midline.      Tonsils: No tonsillar exudate.   Eyes:      General: Lids are normal.      Conjunctiva/sclera: Conjunctivae normal.      Pupils: Pupils are equal, round, and reactive to light.   Neck:      Thyroid: No thyroid mass, thyromegaly or thyroid tenderness.      Vascular: No carotid bruit.      Trachea: Trachea and phonation normal. No tracheal tenderness.   Cardiovascular:      Rate and Rhythm: Normal rate and regular rhythm.      Pulses: Normal pulses.      Heart sounds: Normal heart sounds, S1 normal and S2 normal. No murmur heard.  Pulmonary:      Effort: Pulmonary effort is normal.      Breath sounds: Normal breath sounds and air entry.   Abdominal:      General: Bowel sounds are normal.      Palpations: Abdomen is soft. There is no hepatomegaly.      Tenderness: There is no abdominal tenderness. There is no right CVA tenderness, left CVA tenderness, guarding or rebound. Negative signs include Reese's sign, Rovsing's sign, McBurney's sign, psoas sign and obturator sign.      Hernia: No hernia is present. There is no hernia in the left inguinal area or right inguinal area.   Genitourinary:     Penis: Normal and circumcised.       Testes: Normal.  Cremasteric reflex is present.      Epididymis:      Right: Normal.      Left: Normal.      Comments: Exam chaperoned by his wife, Lizzy.  Prostate exam was deferred due to seeing urologist.  Musculoskeletal:      Cervical back: Normal range of motion and neck supple.      Right lower leg: No edema.      Left lower leg: No edema.   Lymphadenopathy:      Cervical: No cervical adenopathy.      Right cervical: No superficial, deep or posterior cervical adenopathy.     Left cervical: No superficial, deep or posterior cervical adenopathy.      Lower Body: No right inguinal adenopathy. No left inguinal adenopathy.   Skin:     General: Skin is warm and dry.      Capillary Refill: Capillary refill takes less than 2 seconds.   Neurological:      Mental Status: He is alert and oriented to person, place, and time.      Deep Tendon Reflexes: Reflexes are normal and symmetric.      Reflex Scores:       Patellar reflexes are 2+ on the right side and 2+ on the left side.  Psychiatric:         Attention and Perception: Attention and perception normal.         Mood and Affect: Mood and affect normal.         Speech: Speech normal.         Behavior: Behavior normal. Behavior is cooperative.         Thought Content: Thought content normal.         Cognition and Memory: Cognition and memory normal.         Judgment: Judgment normal.     I wore a facial mask during this patient encounter.  Patient also wearing a surgical mask.  Hand hygiene performed before and after seeing the patient.     Result Review :     CMP    CMP 9/10/22 9/11/22 11/4/22   Glucose 111 (A) 148 (A) 89   BUN 15 15 20   Creatinine 1.24 1.11 1.07   Sodium 138 136 143   Potassium 3.8 4.0 4.2   Chloride 105 102 104   Calcium 9.8 9.2 10.5   Total Protein   6.4   Albumin 4.00  4.40   Globulin   2.0   Total Bilirubin 0.4  0.6   Alkaline Phosphatase 69  65   AST (SGOT) 21  17   ALT (SGPT) 22  18   (A) Abnormal value            CBC w/diff    CBC w/Diff 9/10/22 9/11/22 11/4/22    WBC 7.68 6.10 7.21   RBC 5.33 4.91 4.98   Hemoglobin 16.5 15.2 15.3   Hematocrit 50.0 46.0 43.9   MCV 93.8 93.7 88.2   MCH 31.0 31.0 30.7   MCHC 33.0 33.0 34.9   RDW 13.5 13.3 12.8   Platelets 207 194 281   Neutrophil Rel % 80.0 (A) 87.2 (A) 57.5   Immature Granulocyte Rel % 0.3 0.3    Lymphocyte Rel % 7.0 (A) 9.2 (A) 31.2   Monocyte Rel % 12.4 (A) 3.1 (A) 8.6   Eosinophil Rel % 0.0 (A) 0.0 (A) 2.2   Basophil Rel % 0.3 0.2 0.4   (A) Abnormal value            Lipid Panel    Lipid Panel 11/4/22   Total Cholesterol 204 (A)   Triglycerides 108   HDL Cholesterol 59   VLDL Cholesterol 19   LDL Cholesterol  126 (A)   LDL/HDL Ratio 2.09   (A) Abnormal value       Comments are available for some flowsheets but are not being displayed.                   PSA    PSA 11/4/22   PSA 3.520                UROLOGY - SCAN - BPH/ELEV PSA, FIRST URO, 10/31/2022 (10/31/2022)   CT Abdomen Pelvis With Contrast (09/10/2022 17:54)     UROLOGY - SCAN - BPH/ELEV PSA, FIRST URO, 10/31/2022 (10/31/2022)   CT Abdomen Pelvis With Contrast (09/10/2022 17:54)            Assessment and Plan    Diagnoses and all orders for this visit:    1. Annual physical exam (Primary)    2. Need for influenza vaccination  -     FluLaval/Fluarix/Fluzone >6 Months    3. Abnormal MRI of abdomen  -     Ambulatory Referral to General Surgery    4. Essential hypertension  -     amLODIPine (NORVASC) 10 MG tablet; Take 1 tablet by mouth Daily.  Dispense: 90 tablet; Refill: 3    5. Hypercholesteremia  -     Comprehensive Metabolic Panel; Future  -     Lipid Panel With LDL / HDL Ratio; Future      1.  Annual physical exam with hypertension: Blood pressure was in therapeutic range.  He will continue his current amlodipine medication at home as directed.  I have sent a refill to pharmacy.  Have reviewed above blood work with him today.  Follow-up in 6 months for blood pressure check.  2.  Need for influenza vaccination:  Adan has given written consent to receive flu shot  today.  3.  New abnormal MRI of abdomen/pelvis: I reviewed his above urology, CT scan of abdomen pelvis and recent MRI 5 abdomen/pelvis with him at office visit today.  He was noted to have prostate enlargement with a prostate lesion.  Have reviewed his urology consult notes as well.  They have brought MRI results for my review today.  Noted to have small bilateral containing inguinal hernias with enlarged prostate.  He will keep his appointment with the urologist.  Have placed a referral to his general surgeon for further evaluation of bilateral hernias noted on MRI.  These were not noted on his recent CT scan.  Physical exam was unremarkable for hernias.  4.  Chronic and stable hypercholesterolemia: Have reviewed above blood work.  LDL cholesterol was slightly elevated.  He will try to add cinnamon to his current fish oil/omega-3.  Plan to recheck fasting lab work in 6 months.    Patient Counseling:  --Nutrition: Stressed importance of moderation in sodium/caffeine intake, saturated fat and cholesterol.  Discussed caloric balance, sufficient intake of fresh fruits, vegetables, fiber,   calcium, iron.  --Discussed the new recommendation against daily use of baby aspirin for primary prevention in low risk patients.  --Exercise: Stressed the importance of regular exercise by incorporating into daily routine.    --Substance Abuse: Discussed cessation/primary prevention of tobacco, alcohol, or other drug use; driving or other dangerous activities under the influence.    --Dental health: Discussed importance of regular tooth brushing, flossing, and dental visits.  -- Suggested having eyes and vision checked if needed or past due.  --Immunizations reviewed.  --Discussed benefits of screening colonoscopy.        Follow Up   Return in about 6 months (around 5/11/2023), or HTN cholesterol labs prior.  Patient was given instructions and counseling regarding his condition or for health maintenance advice. Please see specific  information pulled into the AVS if appropriate.     [facility]  [address] [address]  Dept: [phone]  Dept Fax: [phone]  Loc: [phone]  Loc Fax: [phone]

## 2022-11-22 RX ORDER — MELOXICAM 15 MG/1
TABLET ORAL
Qty: 30 TABLET | Refills: 1 | Status: SHIPPED | OUTPATIENT
Start: 2022-11-22 | End: 2023-01-18

## 2022-11-30 ENCOUNTER — TELEPHONE (OUTPATIENT)
Dept: FAMILY MEDICINE CLINIC | Facility: CLINIC | Age: 52
End: 2022-11-30

## 2022-11-30 NOTE — TELEPHONE ENCOUNTER
Caller: Adan Chauhan    Relationship to patient: Self    Best call back number:[phone]     Date of exposure: UNKNOWN    Date of positive COVID19 test: HOME TEST 11/29/22 / SYMPTOMS STARTED 11/29/22    COVID19 symptoms: HEADACHE / CONGESTION / FEVER     Date of initial quarantine:     Additional information or concerns: PATIENT CALLING WANTING TO KNOW IF HE WOULD BE ELIGIBLE FOR PAXLOVID    What is the patients preferred pharmacy: [facility] [id number] - [place] - [address] - [phone] PH - [phone] FX  [phone]

## 2022-11-30 NOTE — TELEPHONE ENCOUNTER
Please call pt to schedule a virtual appointment to discuss treatment options/eligibility for covid

## 2022-12-01 ENCOUNTER — TELEMEDICINE (OUTPATIENT)
Dept: FAMILY MEDICINE CLINIC | Facility: CLINIC | Age: 52
End: 2022-12-01

## 2022-12-01 DIAGNOSIS — R09.81 HEAD CONGESTION: ICD-10-CM

## 2022-12-01 DIAGNOSIS — R05.1 ACUTE COUGH: ICD-10-CM

## 2022-12-01 DIAGNOSIS — R09.89 CHEST CONGESTION: ICD-10-CM

## 2022-12-01 DIAGNOSIS — U07.1 POSITIVE SELF-ADMINISTERED ANTIGEN TEST FOR COVID-19: Primary | ICD-10-CM

## 2022-12-01 PROCEDURE — 99213 OFFICE O/P EST LOW 20 MIN: CPT | Performed by: PHYSICIAN ASSISTANT

## 2022-12-01 RX ORDER — METHYLPREDNISOLONE 4 MG/1
TABLET ORAL
Qty: 21 TABLET | Refills: 0 | Status: SHIPPED | OUTPATIENT
Start: 2022-12-01 | End: 2022-12-07

## 2022-12-01 NOTE — PROGRESS NOTES
Chief Complaint  Cough, Nasal Congestion (Covid positive home test 11/29/22), and URI    Subjective          You have chosen to receive care through a telehealth visit.  Do you consent to use a video/audio connection for your medical care today? Yes      Adan is currently home in a secure location in [facility].    I am currently at my private office at [facility].    Adan Chauhan presents to [facility]  History of Present Illness  Adan is a 52-year-old male who presents using video visit today with new onset cough, body aches, headache, fever and positive COVID test at home.  Adan states this is the third time he has had COVID in the past 2 years.  His fever has been around 101.  Has been taking 800 mg of ibuprofen 3 times daily.  Has also been taking Coricidin for his cough and head congestion.  He has had decreased taste, sinus pressure, head congestion, chest congestion and cough.  Will be having a prostate biopsy next week.  States he tested positive for COVID on November 29, 2022.  Symptoms started on Monday.  Appetite has been decreased and sleep has been restless.  He has been quarantining at home in the basement.  Denied any chest pain, shortness of air, wheezing, nausea, vomiting, diarrhea, or ear pain.    Review of Systems   Constitutional: Positive for fatigue and fever.   HENT: Positive for congestion.    Respiratory: Positive for cough. Negative for shortness of breath and wheezing.    Cardiovascular: Negative.    Gastrointestinal: Negative.    Musculoskeletal: Positive for myalgias.   Psychiatric/Behavioral: Positive for sleep disturbance.     Objective   Vital Signs: Vital signs were not obtained due to video visit  There were no vitals taken for this visit.    Physical Exam  Constitutional:       Appearance: Normal appearance. He is well-developed and well-groomed. He is ill-appearing.   HENT:      Head: Normocephalic and  atraumatic.      Right Ear: Hearing normal.      Left Ear: Hearing normal.   Neurological:      Mental Status: He is alert and oriented to person, place, and time.   Psychiatric:         Attention and Perception: Attention and perception normal.         Mood and Affect: Mood and affect normal.         Speech: Speech normal.         Behavior: Behavior normal. Behavior is cooperative.         Thought Content: Thought content normal.         Cognition and Memory: Cognition and memory normal.         Judgment: Judgment normal.     Physical exam was limited due to video visit.    Result Review :                 Assessment and Plan    Diagnoses and all orders for this visit:    1. Positive self-administered antigen test for COVID-19 (Primary)  -     Nirmatrelvir&Ritonavir 300/100 (PAXLOVID) 20 x 150 MG & 10 x 100MG tablet therapy pack tablet; Take 3 tablets by mouth 2 (Two) Times a Day for 5 days.  Dispense: 30 each; Refill: 0  -     methylPREDNISolone (MEDROL) 4 MG dose pack; Take as directed on package instructions.  Dispense: 21 tablet; Refill: 0  -     QUESTIONNAIRE SERIES    2. Acute cough  -     methylPREDNISolone (MEDROL) 4 MG dose pack; Take as directed on package instructions.  Dispense: 21 tablet; Refill: 0  -     QUESTIONNAIRE SERIES    3. Chest congestion  -     methylPREDNISolone (MEDROL) 4 MG dose pack; Take as directed on package instructions.  Dispense: 21 tablet; Refill: 0  -     QUESTIONNAIRE SERIES    4. Head congestion  -     methylPREDNISolone (MEDROL) 4 MG dose pack; Take as directed on package instructions.  Dispense: 21 tablet; Refill: 0  -     QUESTIONNAIRE SERIES    Adan was seen using video visit today with new chest congestion, head congestion, cough, and positive home COVID test.  Have discussed the Paxlovid medication.  He would like to start the medication.  I have sent the Paxlovid to pharmacy with a Medrol Dosepak.  He was instructed to switch to Tylenol due to upcoming prostate biopsy  next week.  He has notified urologist of his positive test.  He will quarantine at home for a total of 5 days but will need to wear a mask for additional 5 days.  Increase fluid intake and continue his over-the-counter medication for cough for now.  He will notify office if symptoms do not improve.  I spent 16 minutes caring for Adan on this date of service. This time includes time spent by me in the following activities:preparing for the visit, obtaining and/or reviewing a separately obtained history, performing a medically appropriate examination and/or evaluation , counseling and educating the patient/family/caregiver, ordering medications, tests, or procedures and documenting information in the medical record     Follow Up   Return if symptoms worsen or fail to improve.  Patient was given instructions and counseling regarding his condition or for health maintenance advice. Please see specific information pulled into the AVS if appropriate.     [facility]ILOME[facility]  [address] [place] [address]  Dept: [phone]  Dept Fax: [phone]  Loc: [phone]  Loc Fax: [phone]

## 2022-12-07 ENCOUNTER — OFFICE VISIT (OUTPATIENT)
Dept: SURGERY | Facility: CLINIC | Age: 52
End: 2022-12-07

## 2022-12-07 VITALS
HEART RATE: 72 BPM | RESPIRATION RATE: 16 BRPM | HEIGHT: 74 IN | DIASTOLIC BLOOD PRESSURE: 80 MMHG | BODY MASS INDEX: 28.88 KG/M2 | WEIGHT: 225 LBS | SYSTOLIC BLOOD PRESSURE: 122 MMHG

## 2022-12-07 DIAGNOSIS — R93.5 ABNORMAL MRI, PELVIS: Primary | ICD-10-CM

## 2022-12-07 PROBLEM — K35.80 ACUTE APPENDICITIS, UNSPECIFIED ACUTE APPENDICITIS TYPE: Status: RESOLVED | Noted: 2022-09-10 | Resolved: 2022-12-07

## 2022-12-07 PROCEDURE — 99213 OFFICE O/P EST LOW 20 MIN: CPT | Performed by: SURGERY

## 2022-12-07 NOTE — PROGRESS NOTES
Adan Chauhan 52 y.o. male presents to discuss results of recent MRI that shows bilat ing hernias.  Pt denies feeling a bulge or pain.   Chief Complaint   Patient presents with   • Hernia             HPI   Above-noted agree.  Adan is known to my service.  I performed a laparoscopic appendectomy on him for acute appendicitis.  He was having issues with his prostate and had an MRI that showed a possible prostate cancer as well as bilateral inguinal hernias.  On discussion with Adan, he has no bulge, he has no pain, he has no difficulties urinating.  He does workout quite frequently.  He has no symptoms associated with any hernias.      Review of Systems          Current Outpatient Medications:   •  amLODIPine (NORVASC) 10 MG tablet, Take 1 tablet by mouth Daily., Disp: 90 tablet, Rfl: 3  •  aspirin 81 MG tablet, Take 81 mg by mouth Daily., Disp: , Rfl:   •  cetirizine (zyrTEC) 10 MG tablet, TAKE ONE TABLET BY MOUTH DAILY, Disp: 90 tablet, Rfl: 2  •  meloxicam (MOBIC) 15 MG tablet, TAKE ONE TABLET BY MOUTH DAILY, Disp: 30 tablet, Rfl: 1  •  Multiple Vitamins-Minerals (MULTI COMPLETE PO), Take  by mouth Daily., Disp: , Rfl:   •  Omega-3 Fatty Acids (fish oil) 1000 MG capsule capsule, Take  by mouth Daily With Breakfast., Disp: , Rfl:   •  vitamin C (ASCORBIC ACID) 250 MG tablet, Take 1,000 mg by mouth Daily., Disp: , Rfl:   •  vitamin D3 125 MCG (5000 UT) capsule capsule, Take 5,000 Units by mouth Daily., Disp: , Rfl:         No Known Allergies        Past Medical History:   Diagnosis Date   • Chest pain    • Hypertension    • Lightheadedness    • MVP (mitral valve prolapse)    • Palpitations    • Sinus tachycardia    • Visual disturbances            Past Surgical History:   Procedure Laterality Date   • ADENOIDECTOMY     • APPENDECTOMY N/A 9/10/2022    Procedure: APPENDECTOMY LAPAROSCOPIC;  Surgeon: Lakshmi Doty DO;  Location: [facility];  Service: General;  Laterality: N/A;  LAPAROSCOPIC APPENDECTOMY   •  "CARPAL TUNNEL RELEASE     • COLONOSCOPY N/A 10/22/2018    Procedure: COLONOSCOPY TO CECUM;  Surgeon: Flaco Fountain MD;  Location: [facility];  Service: Gastroenterology   • HEMORROIDECTOMY     • TONSILLECTOMY             Social History     Tobacco Use   • Smoking status: Former     Packs/day: 0.50     Years: 15.00     Pack years: 7.50     Types: Cigarettes     Start date:      Quit date: 2000     Years since quittin.9   • Smokeless tobacco: Never   Vaping Use   • Vaping Use: Never used   Substance Use Topics   • Alcohol use: Yes     Alcohol/week: 7.0 standard drinks     Types: 7 Glasses of wine per week     Comment: 1 g/o red wine daily w/dinner   • Drug use: No           Immunization History   Administered Date(s) Administered   • COVID-19 (MODERNA) 1st, 2nd, 3rd Dose Only 2021, 2021   • DTaP 2012   • FluLaval/Fluzone >6mos 2019, 10/21/2021, 2022   • Hepatitis A 2018   • Tdap 2012, 2012           Physical Exam  Constitutional:       Appearance: Normal appearance.   Musculoskeletal:         General: No swelling or tenderness.   Skin:     General: Skin is warm and dry.   Neurological:      General: No focal deficit present.      Mental Status: He is alert and oriented to person, place, and time.   Psychiatric:         Mood and Affect: Mood normal.         Behavior: Behavior normal.         Debilities/Disabilities Identified: None    Emotional Behavior: Appropriate      /80   Pulse 72   Resp 16   Ht 188 cm (74\")   Wt 102 kg (225 lb)   BMI 28.89 kg/m²         Diagnoses and all orders for this visit:    1. Abnormal MRI, pelvis (Primary)    I discussed with Adan that since he has no symptoms that it is safe to forego surgery at this time.  He has a lot going on with his prostate and it is not necessary to repair an asymptomatic hernia at this time.  I told him if he should develop symptoms or bulge or need anything else he can call or return " anytime as needed.    Thank you for allowing me to participate in the care of this interesting patient.

## 2023-01-18 RX ORDER — MELOXICAM 15 MG/1
TABLET ORAL
Qty: 30 TABLET | Refills: 1 | Status: SHIPPED | OUTPATIENT
Start: 2023-01-18 | End: 2023-03-27 | Stop reason: SDUPTHER

## 2023-01-18 NOTE — TELEPHONE ENCOUNTER
Rx Refill Note  Requested Prescriptions     Pending Prescriptions Disp Refills    meloxicam (MOBIC) 15 MG tablet [Pharmacy Med Name: MELOXICAM 15 MG TABLET] 30 tablet 1     Sig: TAKE ONE TABLET BY MOUTH DAILY      Last office visit with prescribing clinician: 11/18/2021      Next office visit with prescribing clinician: Visit date not found   Last Filled:11.22.2022    DX:  1. Subacromial bursitis of left shoulder joint    2. Tendinitis of left rotator cuff        Elizabeth Vicente MA  01/18/23, 08:45 EST    {TIP  Encounters:    {TIP  Please add Last Relevant Lab Date if appropriate:  {TIP  Is Refill Pharmacy correct?:

## 2023-01-20 NOTE — ED NOTES
Patient taken to surgery with Frank RICHARD via wheelchair. Patient alert and oriented x4 upon transport. Personal belongings given to wife.    Head is atraumatic. Head shape is symmetrical. [Symptom and Test Evaluation] : symptom and test evaluation

## 2023-03-27 RX ORDER — MELOXICAM 15 MG/1
15 TABLET ORAL DAILY
Qty: 30 TABLET | Refills: 1 | Status: SHIPPED | OUTPATIENT
Start: 2023-03-27

## 2023-03-27 NOTE — TELEPHONE ENCOUNTER
Rx Refill Note  Requested Prescriptions     Pending Prescriptions Disp Refills   • meloxicam (MOBIC) 15 MG tablet 30 tablet 1     Sig: Take 1 tablet by mouth Daily.      Last office visit with prescribing clinician: 11/18/2021      Next office visit with prescribing clinician: Visit date not found   Last Filled: 01.18.2023    DX:  1. Subacromial bursitis of left shoulder joint    2. Tendinitis of left rotator cuff        Elizabeth Vicente MA  03/27/23, 10:45 EDT    Previous RX pended for your approval, change or denial.     {TIP  Encounters:    {TIP  Please add Last Relevant Lab Date if appropriate:  {TIP  Is Refill Pharmacy correct?:

## 2023-04-19 DIAGNOSIS — E78.00 HYPERCHOLESTEREMIA: Primary | ICD-10-CM

## 2023-04-19 DIAGNOSIS — R53.82 CHRONIC FATIGUE: ICD-10-CM

## 2023-04-19 DIAGNOSIS — E78.00 HYPERCHOLESTEREMIA: ICD-10-CM

## 2023-05-13 LAB
25(OH)D3+25(OH)D2 SERPL-MCNC: 27.2 NG/ML (ref 30–100)
ALBUMIN SERPL-MCNC: 4.4 G/DL (ref 3.5–5.2)
ALBUMIN/GLOB SERPL: 2.2 G/DL
ALP SERPL-CCNC: 67 U/L (ref 39–117)
ALT SERPL-CCNC: 23 U/L (ref 1–41)
AST SERPL-CCNC: 17 U/L (ref 1–40)
BILIRUB SERPL-MCNC: 0.5 MG/DL (ref 0–1.2)
BUN SERPL-MCNC: 16 MG/DL (ref 6–20)
BUN/CREAT SERPL: 14.5 (ref 7–25)
CALCIUM SERPL-MCNC: 10.9 MG/DL (ref 8.6–10.5)
CHLORIDE SERPL-SCNC: 105 MMOL/L (ref 98–107)
CHOLEST SERPL-MCNC: 197 MG/DL (ref 0–200)
CO2 SERPL-SCNC: 31.6 MMOL/L (ref 22–29)
CREAT SERPL-MCNC: 1.1 MG/DL (ref 0.76–1.27)
EGFRCR SERPLBLD CKD-EPI 2021: 81.3 ML/MIN/1.73
ERYTHROCYTE [DISTWIDTH] IN BLOOD BY AUTOMATED COUNT: 12.9 % (ref 12.3–15.4)
GLOBULIN SER CALC-MCNC: 2 GM/DL
GLUCOSE SERPL-MCNC: 94 MG/DL (ref 65–99)
HCT VFR BLD AUTO: 47.2 % (ref 37.5–51)
HDLC SERPL-MCNC: 55 MG/DL (ref 40–60)
HGB BLD-MCNC: 16.3 G/DL (ref 13–17.7)
LDLC SERPL CALC-MCNC: 122 MG/DL (ref 0–100)
LDLC/HDLC SERPL: 2.17 {RATIO}
MCH RBC QN AUTO: 30.5 PG (ref 26.6–33)
MCHC RBC AUTO-ENTMCNC: 34.5 G/DL (ref 31.5–35.7)
MCV RBC AUTO: 88.4 FL (ref 79–97)
PLATELET # BLD AUTO: 260 10*3/MM3 (ref 140–450)
POTASSIUM SERPL-SCNC: 4.4 MMOL/L (ref 3.5–5.2)
PROT SERPL-MCNC: 6.4 G/DL (ref 6–8.5)
RBC # BLD AUTO: 5.34 10*6/MM3 (ref 4.14–5.8)
SODIUM SERPL-SCNC: 144 MMOL/L (ref 136–145)
TRIGL SERPL-MCNC: 114 MG/DL (ref 0–150)
TSH SERPL DL<=0.005 MIU/L-ACNC: 1.61 UIU/ML (ref 0.27–4.2)
VLDLC SERPL CALC-MCNC: 20 MG/DL (ref 5–40)
WBC # BLD AUTO: 6.13 10*3/MM3 (ref 3.4–10.8)

## 2023-05-26 ENCOUNTER — OFFICE VISIT (OUTPATIENT)
Dept: FAMILY MEDICINE CLINIC | Facility: CLINIC | Age: 53
End: 2023-05-26
Payer: COMMERCIAL

## 2023-05-26 VITALS
BODY MASS INDEX: 29.52 KG/M2 | DIASTOLIC BLOOD PRESSURE: 80 MMHG | OXYGEN SATURATION: 98 % | HEIGHT: 74 IN | WEIGHT: 230 LBS | RESPIRATION RATE: 15 BRPM | TEMPERATURE: 97.7 F | HEART RATE: 68 BPM | SYSTOLIC BLOOD PRESSURE: 120 MMHG

## 2023-05-26 DIAGNOSIS — E55.9 VITAMIN D INSUFFICIENCY: ICD-10-CM

## 2023-05-26 DIAGNOSIS — Z23 NEED FOR TETANUS, DIPHTHERIA, AND ACELLULAR PERTUSSIS (TDAP) VACCINE IN PATIENT OF ADOLESCENT AGE OR OLDER: ICD-10-CM

## 2023-05-26 DIAGNOSIS — E78.00 HYPERCHOLESTEREMIA: ICD-10-CM

## 2023-05-26 DIAGNOSIS — I10 ESSENTIAL HYPERTENSION: Primary | ICD-10-CM

## 2023-05-26 RX ORDER — AMLODIPINE BESYLATE 10 MG/1
10 TABLET ORAL DAILY
Qty: 90 TABLET | Refills: 3 | Status: SHIPPED | OUTPATIENT
Start: 2023-05-26

## 2023-05-26 NOTE — PROGRESS NOTES
"Chief Complaint  Hypertension (management) and Hyperlipidemia (management)    Subjective          Adan Chauhan presents to [facility] PRIMARY [facility]ARE  History of Present Illness  Adan is a 52 year old male who presents hypertension and hyperlipidemia management.  He has gained 5 pounds since last office visit in November 2022.  Overall diet has been healthy.  He has been exercising.  Takes meloxicam occasionally for his shoulder pain.  Bowel movements have been normal without dark black tarry stools.  Sleep has been normal.  Has been taking fish oil to help lower his cholesterol.  Denied any current chest pain, shortness of air, cough, wheezing, abdominal pain, GI upset or swelling of ankles.     Objective   Vital Signs:   /80   Pulse 68   Temp 97.7 °F (36.5 °C)   Resp 15   Ht 188 cm (74\")   Wt 104 kg (230 lb)   SpO2 98%   BMI 29.53 kg/m²     Physical Exam  Vitals and nursing note reviewed.   Constitutional:       Appearance: Normal appearance. He is well-developed, well-groomed and overweight.   HENT:      Head: Normocephalic and atraumatic.   Neck:      Vascular: No carotid bruit.      Trachea: Trachea and phonation normal. No tracheal tenderness.   Cardiovascular:      Rate and Rhythm: Normal rate and regular rhythm.      Pulses: Normal pulses.      Heart sounds: Normal heart sounds, S1 normal and S2 normal. No murmur heard.  Pulmonary:      Effort: Pulmonary effort is normal.      Breath sounds: Normal breath sounds and air entry.   Abdominal:      General: Bowel sounds are normal.      Palpations: Abdomen is soft. There is no hepatomegaly.      Tenderness: There is no abdominal tenderness. There is no right CVA tenderness, left CVA tenderness, guarding or rebound. Negative signs include Reese's sign, Rovsing's sign, McBurney's sign, psoas sign and obturator sign.   Musculoskeletal:      Cervical back: Neck supple.      Right lower leg: No edema.      Left lower leg: No edema. "   Skin:     General: Skin is warm and dry.      Capillary Refill: Capillary refill takes less than 2 seconds.   Neurological:      Mental Status: He is alert and oriented to person, place, and time.   Psychiatric:         Attention and Perception: Attention and perception normal.         Mood and Affect: Mood and affect normal.         Speech: Speech normal.         Behavior: Behavior normal. Behavior is cooperative.         Thought Content: Thought content normal.         Cognition and Memory: Cognition and memory normal.         Judgment: Judgment normal.        Result Review :        No visits with results within 2 Week(s) from this visit.   Latest known visit with results is:   Orders Only on 04/19/2023   Component Date Value Ref Range Status   • TSH 05/12/2023 1.610  0.270 - 4.200 uIU/mL Final   • Total Cholesterol 05/12/2023 197  0 - 200 mg/dL Final    Comment: Cholesterol Reference Ranges  (U.S. Department of Health and Human Services ATP III  Classifications)  Desirable          <200 mg/dL  Borderline High    200-239 mg/dL  High Risk          >240 mg/dL  Triglyceride Reference Ranges  (U.S. Department of Health and Human Services ATP III  Classifications)  Normal           <150 mg/dL  Borderline High  150-199 mg/dL  High             200-499 mg/dL  Very High        >500 mg/dL  HDL Reference Ranges  (U.S. Department of Health and Human Services ATP III  Classifications)  Low     <40 mg/dl (major risk factor for CHD)  High    >60 mg/dl ('negative' risk factor for CHD)  LDL Reference Ranges  (U.S. Department of Health and Human Services ATP III  Classifications)  Optimal          <100 mg/dL  Near Optimal     100-129 mg/dL  Borderline High  130-159 mg/dL  High             160-189 mg/dL  Very High        >189 mg/dL     • Triglycerides 05/12/2023 114  0 - 150 mg/dL Final   • HDL Cholesterol 05/12/2023 55  40 - 60 mg/dL Final   • VLDL Cholesterol Bryon 05/12/2023 20  5 - 40 mg/dL Final   • LDL Chol Calc ([facility])  05/12/2023 122 (H)  0 - 100 mg/dL Final   • LDL/HDL RATIO 05/12/2023 2.17   Final   • Glucose 05/12/2023 94  65 - 99 mg/dL Final   • BUN 05/12/2023 16  6 - 20 mg/dL Final   • Creatinine 05/12/2023 1.10  0.76 - 1.27 mg/dL Final   • EGFR Result 05/12/2023 81.3  >60.0 mL/min/1.73 Final    Comment: GFR Normal >60  Chronic Kidney Disease <60  Kidney Failure <15     • BUN/Creatinine Ratio 05/12/2023 14.5  7.0 - 25.0 Final   • Sodium 05/12/2023 144  136 - 145 mmol/L Final   • Potassium 05/12/2023 4.4  3.5 - 5.2 mmol/L Final   • Chloride 05/12/2023 105  98 - 107 mmol/L Final   • Total CO2 05/12/2023 31.6 (H)  22.0 - 29.0 mmol/L Final   • Calcium 05/12/2023 10.9 (H)  8.6 - 10.5 mg/dL Final   • Total Protein 05/12/2023 6.4  6.0 - 8.5 g/dL Final   • Albumin 05/12/2023 4.4  3.5 - 5.2 g/dL Final   • Globulin 05/12/2023 2.0  gm/dL Final   • A/G Ratio 05/12/2023 2.2  g/dL Final   • Total Bilirubin 05/12/2023 0.5  0.0 - 1.2 mg/dL Final   • Alkaline Phosphatase 05/12/2023 67  39 - 117 U/L Final   • AST (SGOT) 05/12/2023 17  1 - 40 U/L Final   • ALT (SGPT) 05/12/2023 23  1 - 41 U/L Final   • WBC 05/12/2023 6.13  3.40 - 10.80 10*3/mm3 Final   • RBC 05/12/2023 5.34  4.14 - 5.80 10*6/mm3 Final   • Hemoglobin 05/12/2023 16.3  13.0 - 17.7 g/dL Final   • Hematocrit 05/12/2023 47.2  37.5 - 51.0 % Final   • MCV 05/12/2023 88.4  79.0 - 97.0 fL Final   • MCH 05/12/2023 30.5  26.6 - 33.0 pg Final   • MCHC 05/12/2023 34.5  31.5 - 35.7 g/dL Final   • RDW 05/12/2023 12.9  12.3 - 15.4 % Final   • Platelets 05/12/2023 260  140 - 450 10*3/mm3 Final                 Assessment and Plan    Diagnoses and all orders for this visit:    1. Essential hypertension (Primary)  -     amLODIPine (NORVASC) 10 MG tablet; Take 1 tablet by mouth Daily.  Dispense: 90 tablet; Refill: 3    2. Hypercholesteremia    3. Vitamin D insufficiency  -     vitamin D3 125 MCG (5000 UT) capsule capsule; Take 1 capsule by mouth Daily.  Dispense: 30 capsule; Refill: 6    4. Need for  tetanus, diphtheria, and acellular pertussis (Tdap) vaccine in patient of adolescent age or older  -     Tdap Vaccine Greater Than or Equal To 6yo IM    1.  Chronic and stable hypertension: I have rechecked blood pressure and got 118/78 in left arm.  I have sent a refill amlodipine to pharmacy.  Follow-up in 6 months with annual physical.  2.  Chronic and stable hypercholesterolemia: I have reviewed above blood work with him today.  LDL was slightly elevated.  Stressed the importance of continue to eat healthy and increasing physical activity.  We will decrease his fish oil to every other day due to elevated calcium.  3.  New vitamin D insufficiency: Vitamin D level slightly decreased.  Have sent a prescription for vitamin D 3000 international units to take once daily.  We will recheck vitamin D level in 6 months.  3.  Need for updated Tdap vaccine patient:  Adan has given written consent to receive updated Tdap today.    I spent 22 minutes caring for Adan on this date of service. This time includes time spent by me in the following activities:preparing for the visit, reviewing tests, obtaining and/or reviewing a separately obtained history, performing a medically appropriate examination and/or evaluation , counseling and educating the patient/family/caregiver, ordering medications, tests, or procedures and documenting information in the medical record  Follow Up   Return in about 6 months (around 11/26/2023), or labs, for Annual.  Patient was given instructions and counseling regarding his condition or for health maintenance advice. Please see specific information pulled into the AVS if appropriate.     PHILOMENA Marroquin [facility]  [address] [facility] [facility] [address]  Dept: [phone]  Dept Fax: [phone]  Loc: [phone]  Loc Fax: [phone]

## 2023-09-26 ENCOUNTER — TELEPHONE (OUTPATIENT)
Dept: ORTHOPEDIC SURGERY | Facility: CLINIC | Age: 53
End: 2023-09-26
Payer: COMMERCIAL

## 2023-09-26 NOTE — TELEPHONE ENCOUNTER
Caller: PATIENT    Relationship to patient: SELF     Best call back number: [phone]    Patient is needing: PATIENT WAS CALLING TO LET DR. HIDALGO KNOW THAT HE HAS BEEN HAVING PAIN IN HIS RIGHT SHOULDER AGAIN FOR ABOUT 3 WEEKS NOW. PATIENT STATED THE PAIN WAKES HIM UP AT NIGHT AND HE FEELS THE PAIN WHILE PLAYING SPORTS AS WELL. PATIENT WANTED TO LET DR. HIDALGO KNOW WHAT WAS GOING ON AND SEE HOW HE WOULD LIKE TO MOVE FORWARD. PATIENT WASN'T SURE IF DR. HIDALGO WANTED TO DO AN MRI OR POSSIBLY SEE HIM IN OFFICE. PATIENT WOULD LIKE A CALL BACK TO DISCUSS THIS. THANK YOU!

## 2023-10-03 ENCOUNTER — OFFICE VISIT (OUTPATIENT)
Dept: ORTHOPEDIC SURGERY | Facility: CLINIC | Age: 53
End: 2023-10-03
Payer: COMMERCIAL

## 2023-10-03 VITALS — WEIGHT: 230 LBS | HEIGHT: 74 IN | BODY MASS INDEX: 29.52 KG/M2

## 2023-10-03 DIAGNOSIS — M25.511 ACUTE PAIN OF RIGHT SHOULDER: Primary | ICD-10-CM

## 2023-10-03 DIAGNOSIS — M75.51 SUBACROMIAL BURSITIS OF RIGHT SHOULDER JOINT: ICD-10-CM

## 2023-10-03 RX ORDER — TRIAMCINOLONE ACETONIDE 40 MG/ML
80 INJECTION, SUSPENSION INTRA-ARTICULAR; INTRAMUSCULAR
Status: COMPLETED | OUTPATIENT
Start: 2023-10-03 | End: 2023-10-03

## 2023-10-03 RX ORDER — LIDOCAINE HYDROCHLORIDE 10 MG/ML
4 INJECTION, SOLUTION EPIDURAL; INFILTRATION; INTRACAUDAL; PERINEURAL
Status: COMPLETED | OUTPATIENT
Start: 2023-10-03 | End: 2023-10-03

## 2023-10-03 RX ADMIN — TRIAMCINOLONE ACETONIDE 80 MG: 40 INJECTION, SUSPENSION INTRA-ARTICULAR; INTRAMUSCULAR at 09:34

## 2023-10-03 RX ADMIN — LIDOCAINE HYDROCHLORIDE 4 ML: 10 INJECTION, SOLUTION EPIDURAL; INFILTRATION; INTRACAUDAL; PERINEURAL at 09:34

## 2023-10-03 NOTE — PROGRESS NOTES
Subjective: Right shoulder pain     Patient ID: Adan Chauhan is a 52 y.o. male.    Chief Complaint:    History of Present Illness 52-year-old male returns to the right shoulder again symptomatic.  States the cortisone injection given in  relieved all of his pain until the past 2 to 3 weeks.  Again has resumed his workout activity but has been modifying his activity according to instructions given.  Is not taking the meloxicam.       Social History     Occupational History    Occupation: IT   Tobacco Use    Smoking status: Former     Packs/day: 0.50     Years: 15.00     Pack years: 7.50     Types: Cigarettes     Start date:      Quit date:      Years since quittin.7     Passive exposure: Past    Smokeless tobacco: Never   Vaping Use    Vaping Use: Never used   Substance and Sexual Activity    Alcohol use: Yes     Alcohol/week: 7.0 standard drinks     Types: 7 Glasses of wine per week     Comment: 1 g/o red wine daily w/dinner    Drug use: No    Sexual activity: Yes     Partners: Female      Review of Systems   Constitutional:  Negative for chills, diaphoresis, fever and unexpected weight change.   HENT:  Negative for hearing loss, nosebleeds, sore throat and tinnitus.    Eyes:  Negative for pain and visual disturbance.   Respiratory:  Negative for cough, shortness of breath and wheezing.    Cardiovascular:  Negative for chest pain and palpitations.   Gastrointestinal:  Negative for abdominal pain, diarrhea, nausea and vomiting.   Endocrine: Negative for cold intolerance, heat intolerance and polydipsia.   Genitourinary:  Negative for difficulty urinating, dysuria and hematuria.   Musculoskeletal:  Positive for arthralgias and myalgias. Negative for joint swelling.   Skin:  Negative for rash and wound.   Allergic/Immunologic: Negative for environmental allergies.   Neurological:  Negative for dizziness, syncope and numbness.   Hematological:  Does not bruise/bleed easily.    Psychiatric/Behavioral:  Negative for dysphoric mood and sleep disturbance. The patient is not nervous/anxious.        Past Medical History:   Diagnosis Date    Chest pain     Hypertension     Lightheadedness     MVP (mitral valve prolapse)     Palpitations     Sinus tachycardia     Visual disturbances      Past Surgical History:   Procedure Laterality Date    ADENOIDECTOMY      APPENDECTOMY N/A 9/10/2022    Procedure: APPENDECTOMY LAPAROSCOPIC;  Surgeon: Lakshmi Doty DO;  Location:  [facility] OR;  Service: General;  Laterality: N/A;  LAPAROSCOPIC APPENDECTOMY    CARPAL TUNNEL RELEASE      COLONOSCOPY N/A 10/22/2018    Procedure: COLONOSCOPY TO CECUM;  Surgeon: Flaco Fountain MD;  Location: [facility] ENDOSCOPY;  Service: Gastroenterology    HEMORROIDECTOMY      TONSILLECTOMY       Family History   Problem Relation Age of Onset    Stroke Father     Stroke Paternal Uncle     Hypertension Paternal Uncle     Hypertension Paternal Uncle     Suicide Attempts Mother         suicide    No Known Problems Maternal Grandfather     No Known Problems Paternal Grandmother     No Known Problems Paternal Grandfather     No Known Problems Other          Objective:  There were no vitals filed for this visit.      10/03/23  0926   Weight: 104 kg (230 lb)     Body mass index is 29.53 kg/m².        Ortho Exam   patient is alert and oriented x3.  Again there is no motor or sensory deficit in the right upper extremity.  He can abduct and extend about 170 to 175 degrees with minimal pain.  In 90 degrees against resistance rotator cuff function is 5/5.  Mild pain with crossarm test.  Negative Speed test.  Mildly positive Goodwin sign.  Negative [place]'s test.  External rotation is 35 to 40 degrees internal rotation is to T12.  He tolerates meloxicam but is not currently taking any medication.    Assessment:        1. Acute pain of right shoulder    2. Subacromial bursitis of right shoulder joint           Plan: Reviewed with the  patient once again is history and exam.  He had complete relief following the injection but then is returned in the past 2 to 3 weeks.  He has not taken meloxicam.  After reviewing treatment options to proceed with a repeat injection of the subacromial space of the posterior portal 4 cc of lidocaine and 2 cc of Kenalog.  He was instructed to take the meloxicam for the next 4 weeks.  After that if the pain should return he is to call and I will schedule an MRI otherwise I will see him as needed.  Once again reviewed with him his workout regimen and any exercises to avoid.  Patient was in agreement.  Answered all questions      Large Joint Arthrocentesis: R subacromial bursa  Date/Time: 10/3/2023 9:34 AM  Consent given by: patient  Site marked: site marked  Timeout: Immediately prior to procedure a time out was called to verify the correct patient, procedure, equipment, support staff and site/side marked as required   Supporting Documentation  Indications: pain   Procedure Details  Location: shoulder - R subacromial bursa  Preparation: Patient was prepped and draped in the usual sterile fashion  Needle size: 22 G  Approach: posterior  Medications administered: 4 mL lidocaine PF 1% 1 %; 80 mg triamcinolone acetonide 40 MG/ML  Patient tolerance: patient tolerated the procedure well with no immediate complications          Work Status:    JENNIFER query complete.    Orders:  Orders Placed This Encounter   Procedures    Large Joint Arthrocentesis: R subacromial bursa       Medications:  No orders of the defined types were placed in this encounter.      Followup:  No follow-ups on file.          Dictated utilizing Dragon dictation

## 2023-11-09 DIAGNOSIS — Z00.00 ANNUAL PHYSICAL EXAM: Primary | ICD-10-CM

## 2023-11-09 DIAGNOSIS — E55.9 VITAMIN D INSUFFICIENCY: ICD-10-CM

## 2023-11-09 DIAGNOSIS — E78.00 HYPERCHOLESTEREMIA: ICD-10-CM

## 2023-11-09 DIAGNOSIS — Z00.00 ANNUAL PHYSICAL EXAM: ICD-10-CM

## 2023-11-09 DIAGNOSIS — Z12.5 SCREENING FOR MALIGNANT NEOPLASM OF PROSTATE: ICD-10-CM

## 2023-11-11 LAB
25(OH)D3+25(OH)D2 SERPL-MCNC: 45.1 NG/ML (ref 30–100)
ALBUMIN SERPL-MCNC: 4.7 G/DL (ref 3.5–5.2)
ALBUMIN/GLOB SERPL: 2.4 G/DL
ALP SERPL-CCNC: 72 U/L (ref 39–117)
ALT SERPL-CCNC: 23 U/L (ref 1–41)
AST SERPL-CCNC: 18 U/L (ref 1–40)
BASOPHILS # BLD AUTO: 0.03 10*3/MM3 (ref 0–0.2)
BASOPHILS NFR BLD AUTO: 0.4 % (ref 0–1.5)
BILIRUB SERPL-MCNC: 0.6 MG/DL (ref 0–1.2)
BUN SERPL-MCNC: 16 MG/DL (ref 6–20)
BUN/CREAT SERPL: 14.7 (ref 7–25)
CALCIUM SERPL-MCNC: 10.9 MG/DL (ref 8.6–10.5)
CHLORIDE SERPL-SCNC: 102 MMOL/L (ref 98–107)
CHOLEST SERPL-MCNC: 223 MG/DL (ref 0–200)
CO2 SERPL-SCNC: 30.7 MMOL/L (ref 22–29)
CREAT SERPL-MCNC: 1.09 MG/DL (ref 0.76–1.27)
EGFRCR SERPLBLD CKD-EPI 2021: 81.7 ML/MIN/1.73
EOSINOPHIL # BLD AUTO: 0.06 10*3/MM3 (ref 0–0.4)
EOSINOPHIL NFR BLD AUTO: 0.7 % (ref 0.3–6.2)
ERYTHROCYTE [DISTWIDTH] IN BLOOD BY AUTOMATED COUNT: 12.4 % (ref 12.3–15.4)
GLOBULIN SER CALC-MCNC: 2 GM/DL
GLUCOSE SERPL-MCNC: 96 MG/DL (ref 65–99)
HCT VFR BLD AUTO: 47.1 % (ref 37.5–51)
HDLC SERPL-MCNC: 63 MG/DL (ref 40–60)
HGB BLD-MCNC: 15.8 G/DL (ref 13–17.7)
IMM GRANULOCYTES # BLD AUTO: 0.05 10*3/MM3 (ref 0–0.05)
IMM GRANULOCYTES NFR BLD AUTO: 0.6 % (ref 0–0.5)
LDLC SERPL CALC-MCNC: 140 MG/DL (ref 0–100)
LDLC/HDLC SERPL: 2.17 {RATIO}
LYMPHOCYTES # BLD AUTO: 1.95 10*3/MM3 (ref 0.7–3.1)
LYMPHOCYTES NFR BLD AUTO: 23.6 % (ref 19.6–45.3)
MCH RBC QN AUTO: 31 PG (ref 26.6–33)
MCHC RBC AUTO-ENTMCNC: 33.5 G/DL (ref 31.5–35.7)
MCV RBC AUTO: 92.4 FL (ref 79–97)
MONOCYTES # BLD AUTO: 0.66 10*3/MM3 (ref 0.1–0.9)
MONOCYTES NFR BLD AUTO: 8 % (ref 5–12)
NEUTROPHILS # BLD AUTO: 5.51 10*3/MM3 (ref 1.7–7)
NEUTROPHILS NFR BLD AUTO: 66.7 % (ref 42.7–76)
NRBC BLD AUTO-RTO: 0 /100 WBC (ref 0–0.2)
PLATELET # BLD AUTO: 312 10*3/MM3 (ref 140–450)
POTASSIUM SERPL-SCNC: 4.3 MMOL/L (ref 3.5–5.2)
PROT SERPL-MCNC: 6.7 G/DL (ref 6–8.5)
RBC # BLD AUTO: 5.1 10*6/MM3 (ref 4.14–5.8)
SODIUM SERPL-SCNC: 141 MMOL/L (ref 136–145)
TRIGL SERPL-MCNC: 116 MG/DL (ref 0–150)
VLDLC SERPL CALC-MCNC: 20 MG/DL (ref 5–40)
WBC # BLD AUTO: 8.26 10*3/MM3 (ref 3.4–10.8)

## 2023-11-17 ENCOUNTER — OFFICE VISIT (OUTPATIENT)
Dept: FAMILY MEDICINE CLINIC | Facility: CLINIC | Age: 53
End: 2023-11-17
Payer: COMMERCIAL

## 2023-11-17 VITALS
HEIGHT: 74 IN | HEART RATE: 73 BPM | TEMPERATURE: 98.2 F | BODY MASS INDEX: 28.75 KG/M2 | DIASTOLIC BLOOD PRESSURE: 78 MMHG | WEIGHT: 224 LBS | OXYGEN SATURATION: 98 % | RESPIRATION RATE: 14 BRPM | SYSTOLIC BLOOD PRESSURE: 130 MMHG

## 2023-11-17 DIAGNOSIS — Z00.00 ANNUAL PHYSICAL EXAM: Primary | ICD-10-CM

## 2023-11-17 DIAGNOSIS — I10 ESSENTIAL HYPERTENSION: ICD-10-CM

## 2023-11-17 DIAGNOSIS — E78.00 HYPERCHOLESTEREMIA: ICD-10-CM

## 2023-11-17 DIAGNOSIS — E83.52 HYPERCALCEMIA: ICD-10-CM

## 2023-11-17 DIAGNOSIS — E55.9 VITAMIN D INSUFFICIENCY: ICD-10-CM

## 2023-11-17 RX ORDER — AMLODIPINE BESYLATE 10 MG/1
10 TABLET ORAL DAILY
Qty: 90 TABLET | Refills: 3 | Status: SHIPPED | OUTPATIENT
Start: 2023-11-17

## 2023-11-17 NOTE — PROGRESS NOTES
"Chief Complaint  Annual Exam, Hypertension (Management), and Hyperlipidemia (Management)    Subjective          Adan Chauhan presents to [facility]  History of Present Illness  Adan is a 53 year old male who presents for annual physical exam with hypertension management.  He has lost 6 pounds since last office visit.  States he has been compliant with his blood pressure medication.  He has been averaging around 135/70-95.  States he has noticed when he gets dizzy or upset his lower number will increase.  Caffeine intake is 12-16 ounces of coffee daily.  May drink a 12 ounce soda later in the day.  Diet has been healthy.  Adan has been exercising 2-3 times weekly by doing cardio and 3-5 times weekly doing weight strengthening.  Diet has been fairly healthy.  Tends to eat a yogurt in the morning.  Does not do much other dairy products.  Bowel movements have been regular without dark black tarry stools.  Sleep has been good with the CPAP machine.  Denied any current fevers, chills, upper respiratory symptoms, chest pain, shortness of air, cough, wheezing, abdominal pain, GI upset or swelling of ankles.  Currently sees urologist, Dr. Alli Gates, for prostate issues.  Has upcoming appointment.     Objective   Vital Signs:   /78   Pulse 73   Temp 98.2 °F (36.8 °C)   Resp 14   Ht 188 cm (74\")   Wt 102 kg (224 lb)   SpO2 98%   BMI 28.76 kg/m²     Physical Exam  Vitals and nursing note reviewed.   Constitutional:       Appearance: Normal appearance. He is well-developed, well-groomed and overweight.   HENT:      Head: Normocephalic and atraumatic.      Jaw: There is normal jaw occlusion.      Right Ear: Hearing, tympanic membrane, ear canal and external ear normal.      Left Ear: Hearing, tympanic membrane, ear canal and external ear normal.      Nose: Nose normal.      Right Sinus: No maxillary sinus tenderness or frontal sinus tenderness.      Left Sinus: No maxillary " sinus tenderness or frontal sinus tenderness.      Mouth/Throat:      Lips: Pink.      Mouth: Mucous membranes are moist.      Dentition: Normal dentition.      Tongue: No lesions.      Pharynx: Oropharynx is clear. Uvula midline.      Tonsils: No tonsillar exudate.   Eyes:      General: Lids are normal.      Conjunctiva/sclera: Conjunctivae normal.      Pupils: Pupils are equal, round, and reactive to light.   Neck:      Thyroid: No thyroid mass, thyromegaly or thyroid tenderness.      Vascular: No carotid bruit.      Trachea: Trachea and phonation normal. No tracheal tenderness.   Cardiovascular:      Rate and Rhythm: Normal rate and regular rhythm.      Pulses: Normal pulses.      Heart sounds: Normal heart sounds, S1 normal and S2 normal. No murmur heard.  Pulmonary:      Effort: Pulmonary effort is normal.      Breath sounds: Normal breath sounds and air entry.   Abdominal:      General: Bowel sounds are normal.      Palpations: Abdomen is soft.      Tenderness: There is no abdominal tenderness. There is no right CVA tenderness, left CVA tenderness, guarding or rebound. Negative signs include Reese's sign, Rovsing's sign, McBurney's sign, psoas sign and obturator sign.   Genitourinary:     Comments: Exam was deferred due to seeing urologist for prostate issues.  Musculoskeletal:      Cervical back: Neck supple.      Right lower leg: No edema.      Left lower leg: No edema.   Lymphadenopathy:      Cervical: No cervical adenopathy.      Right cervical: No superficial, deep or posterior cervical adenopathy.     Left cervical: No superficial, deep or posterior cervical adenopathy.   Skin:     General: Skin is warm and dry.      Capillary Refill: Capillary refill takes less than 2 seconds.   Neurological:      Mental Status: He is alert and oriented to person, place, and time.      Deep Tendon Reflexes:      Reflex Scores:       Patellar reflexes are 2+ on the right side and 2+ on the left side.  Psychiatric:          Attention and Perception: Attention and perception normal.         Mood and Affect: Mood and affect normal.         Speech: Speech normal.         Behavior: Behavior is cooperative.         Thought Content: Thought content normal.         Cognition and Memory: Cognition and memory normal.         Judgment: Judgment normal.        Result Review :     CMP          5/12/2023    08:49 11/10/2023    09:53   CMP   Glucose 94  96    BUN 16  16    Creatinine 1.10  1.09    Sodium 144  141    Potassium 4.4  4.3    Chloride 105  102    Calcium 10.9  10.9    Total Protein 6.4  6.7    Albumin 4.4  4.7    Globulin 2.0  2.0    Total Bilirubin 0.5  0.6    Alkaline Phosphatase 67  72    AST (SGOT) 17  18    ALT (SGPT) 23  23    BUN/Creatinine Ratio 14.5  14.7      CBC          5/12/2023    08:49 11/10/2023    09:53   CBC   WBC 6.13  8.26    RBC 5.34  5.10    Hemoglobin 16.3  15.8    Hematocrit 47.2  47.1    MCV 88.4  92.4    MCH 30.5  31.0    MCHC 34.5  33.5    RDW 12.9  12.4    Platelets 260  312      Lipid Panel          5/12/2023    08:49 11/10/2023    09:53   Lipid Panel   Total Cholesterol 197  223    Triglycerides 114  116    HDL Cholesterol 55  63    VLDL Cholesterol 20  20    LDL Cholesterol  122  140    LDL/HDL Ratio 2.17  2.17      TSH          5/12/2023    08:49   TSH   TSH 1.610                    Assessment and Plan    Diagnoses and all orders for this visit:    1. Annual physical exam (Primary)    2. Hypercholesteremia  -     CBC (No Diff); Future  -     Comprehensive Metabolic Panel; Future  -     Lipid Panel With LDL / HDL Ratio; Future    3. Essential hypertension    4. Hypercalcemia  -     PTH, Intact & Calcium  -     Thyroid Panel With TSH  -     Comprehensive Metabolic Panel; Future    5. Vitamin D insufficiency  -     vitamin D3 125 MCG (5000 UT) capsule capsule; Take 1 capsule by mouth Daily.  Dispense: 30 capsule; Refill: 12  -     Vitamin D,25-Hydroxy; Future    1.  Annual physical exam with  hypercholesteremia: Have reviewed above blood work with him today.  Cholesterol readings are elevated slightly.  Have discussed decreasing red meat intake and eating healthier.  Continue exercising regularly.  Plan to recheck fasting blood work in 6 months. Adan will keep his follow-up appointments with urology.  2.  Chronic and stable hypertension: I have rechecked blood pressure and got 112/70 in the left arm.  Doing well with his amlodipine medication.  No refills are needed.  3.  New hypercalcemia: I will check PTH with intact and calcium test with thyroid profile with TSH today for further evaluation of his elevated calcium reading.  He will be notified of test results when completed.  We will consider referral to ENT in future if warranted.  4.  Chronic and stable vitamin D insufficiency: Have refilled his vitamin D 5000 international units once daily to pharmacy.    Patient Counseling:  --Nutrition: Stressed importance of moderation in sodium/caffeine intake, saturated fat and cholesterol.  Discussed caloric balance, sufficient intake of fresh fruits, vegetables, fiber,   calcium, iron.  --Discussed the new recommendation against daily use of baby aspirin for primary prevention in low risk patients.  --Exercise: Stressed the importance of regular exercise by incorporating into daily routine.    --Substance Abuse: Discussed cessation/primary prevention of tobacco, alcohol, or other drug use; driving or other dangerous activities under the influence.    --Dental health: Discussed importance of regular tooth brushing, flossing, and dental visits.  -- Suggested having eyes and vision checked if needed or past due.  --Immunizations reviewed.  --Discussed benefits of screening colonoscopy.    I spent 30 minutes caring for Adan on this date of service. This time includes time spent by me in the following activities:preparing for the visit, reviewing tests, obtaining and/or reviewing a separately obtained history,  performing a medically appropriate examination and/or evaluation , counseling and educating the patient/family/caregiver, ordering medications, tests, or procedures, and documenting information in the medical record  Follow Up   Return in about 6 months (around 5/17/2024), or HTN Hypercholesterolemia- labs prior.  Patient was given instructions and counseling regarding his condition or for health maintenance advice. Please see specific information pulled into the AVS if appropriate.     PHILOMENA Marroquin [facility]  [address] [place] [id number]  Dept: [phone]  Dept Fax: [phone]  Loc: [phone]  Loc Fax: [phone]

## 2023-11-21 LAB
CALCIUM SERPL-MCNC: 10.2 MG/DL (ref 8.7–10.2)
FT4I SERPL CALC-MCNC: 1.8 (ref 1.2–4.9)
INTACT PTH: NORMAL
PTH-INTACT SERPL-MCNC: 24 PG/ML (ref 15–65)
T3RU NFR SERPL: 29 % (ref 24–39)
T4 SERPL-MCNC: 6.3 UG/DL (ref 4.5–12)
TSH SERPL DL<=0.005 MIU/L-ACNC: 2.43 UIU/ML (ref 0.45–4.5)

## 2024-03-13 DIAGNOSIS — M75.51 SUBACROMIAL BURSITIS OF RIGHT SHOULDER JOINT: Primary | ICD-10-CM

## 2024-03-13 DIAGNOSIS — M75.81 ROTATOR CUFF TENDINITIS, RIGHT: ICD-10-CM

## 2024-03-26 ENCOUNTER — OFFICE VISIT (OUTPATIENT)
Dept: ORTHOPEDIC SURGERY | Facility: CLINIC | Age: 54
End: 2024-03-26
Payer: COMMERCIAL

## 2024-03-26 VITALS — BODY MASS INDEX: 28.75 KG/M2 | WEIGHT: 224 LBS | HEIGHT: 74 IN

## 2024-03-26 DIAGNOSIS — M19.011 ARTHRITIS OF RIGHT ACROMIOCLAVICULAR JOINT: ICD-10-CM

## 2024-03-26 DIAGNOSIS — M75.51 SUBACROMIAL BURSITIS OF RIGHT SHOULDER JOINT: Primary | ICD-10-CM

## 2024-03-26 DIAGNOSIS — M75.81 ROTATOR CUFF TENDINITIS, RIGHT: ICD-10-CM

## 2024-03-26 DIAGNOSIS — M25.511 ACUTE PAIN OF RIGHT SHOULDER: ICD-10-CM

## 2024-03-26 PROCEDURE — 99213 OFFICE O/P EST LOW 20 MIN: CPT | Performed by: ORTHOPAEDIC SURGERY

## 2024-03-26 RX ORDER — MELOXICAM 15 MG/1
15 TABLET ORAL DAILY
Qty: 90 TABLET | Refills: 0 | Status: SHIPPED | OUTPATIENT
Start: 2024-03-26

## 2024-03-26 RX ORDER — MELOXICAM 15 MG/1
15 TABLET ORAL DAILY
COMMUNITY
End: 2024-03-26 | Stop reason: SDUPTHER

## 2024-03-26 NOTE — PROGRESS NOTES
Subjective: Right shoulder pain     Patient ID: Adan Chauhan is a 53 y.o. male.    Chief Complaint:    History of Present Illness 53-year-old male returns to review the results of the MRI of his right shoulder.  The report shows a 20% partial tear of the supraspinatus tendon with AC arthritis and osteophyte formation effacement of the rotator cuff.  He has failed 2 cortisone shots and is having persistent pain discomfort.       Social History     Occupational History    Occupation: IT   Tobacco Use    Smoking status: Former     Current packs/day: 0.00     Average packs/day: 0.5 packs/day for 15.0 years (7.5 ttl pk-yrs)     Types: Cigarettes     Start date:      Quit date:      Years since quittin.2     Passive exposure: Past    Smokeless tobacco: Never   Vaping Use    Vaping status: Never Used   Substance and Sexual Activity    Alcohol use: Yes     Alcohol/week: 7.0 standard drinks of alcohol     Types: 7 Glasses of wine per week     Comment: 1 g/o red wine daily w/dinner    Drug use: No    Sexual activity: Yes     Partners: Female      Review of Systems      Past Medical History:   Diagnosis Date    Chest pain     Hypertension     Lightheadedness     MVP (mitral valve prolapse)     Palpitations     Sinus tachycardia     Visual disturbances      Past Surgical History:   Procedure Laterality Date    ADENOIDECTOMY      APPENDECTOMY N/A 9/10/2022    Procedure: APPENDECTOMY LAPAROSCOPIC;  Surgeon: Lakshmi Doty DO;  Location: [facility] OR;  Service: General;  Laterality: N/A;  LAPAROSCOPIC APPENDECTOMY    CARPAL TUNNEL RELEASE      COLONOSCOPY N/A 10/22/2018    Procedure: COLONOSCOPY TO CECUM;  Surgeon: Flaco Fountain MD;  Location: [facility] ENDOSCOPY;  Service: Gastroenterology    HEMORROIDECTOMY      TONSILLECTOMY       Family History   Problem Relation Age of Onset    Stroke Father     Stroke Paternal Uncle     Hypertension Paternal Uncle     Hypertension Paternal Uncle     Suicide Attempts  Mother         suicide    No Known Problems Maternal Grandfather     No Known Problems Paternal Grandmother     No Known Problems Paternal Grandfather     No Known Problems Other          Objective:  There were no vitals filed for this visit.      03/26/24  0820   Weight: 102 kg (224 lb)     Body mass index is 28.76 kg/m².        Ortho Exam   he is alert and oriented x 3.  Again there is no motor or sensory deficit.  He does have a positive crossarm test and mildly positive Goodwin sign.  He is tolerating the anti-inflammatory getting no relief.    Assessment:            1. Subacromial bursitis of right shoulder joint    2. Rotator cuff tendinitis, right    3. Acute pain of right shoulder    4. Arthritis of right acromioclavicular joint           Plan: Reviewed at length with the patient the results of the MRI.  Again he has a partial tear of the supraspinatus tendon with some effacement from AC osteophytes.  I will refer to Dr. Perez to see if he feels he is a candidate for subacromial decompression.  [place] I told him to avoid any kind of exercise placed behind the head specifically tricep exercises.  Patient was in agreement.  Referral has been made.            Work Status:    JENNIFER query complete.    Orders:  No orders of the defined types were placed in this encounter.      Medications:  New Medications Ordered This Visit   Medications    meloxicam (MOBIC) 15 MG tablet     Sig: Take 1 tablet by mouth Daily.     Dispense:  90 tablet     Refill:  0       Followup:  Return in about 1 week (around 4/2/2024).          Dictated utilizing Dragon dictation

## 2024-04-17 ENCOUNTER — OFFICE VISIT (OUTPATIENT)
Dept: ORTHOPEDIC SURGERY | Facility: CLINIC | Age: 54
End: 2024-04-17
Payer: COMMERCIAL

## 2024-04-17 VITALS
BODY MASS INDEX: 29.52 KG/M2 | WEIGHT: 230 LBS | HEIGHT: 74 IN | DIASTOLIC BLOOD PRESSURE: 78 MMHG | SYSTOLIC BLOOD PRESSURE: 131 MMHG | HEART RATE: 66 BPM

## 2024-04-17 DIAGNOSIS — M75.81 ROTATOR CUFF TENDINITIS, RIGHT: ICD-10-CM

## 2024-04-17 DIAGNOSIS — M75.111 INCOMPLETE TEAR OF RIGHT ROTATOR CUFF, UNSPECIFIED WHETHER TRAUMATIC: ICD-10-CM

## 2024-04-17 DIAGNOSIS — M75.51 SUBACROMIAL BURSITIS OF RIGHT SHOULDER JOINT: Primary | ICD-10-CM

## 2024-04-17 PROCEDURE — 99214 OFFICE O/P EST MOD 30 MIN: CPT | Performed by: ORTHOPAEDIC SURGERY

## 2024-04-17 NOTE — PROGRESS NOTES
Subjective:     Patient ID: Adan Chauhan is a 53 y.o. male.    Chief Complaint:  Right shoulder pain, new patient to examiner  History of Present Illness  Adan Chauhan presents to clinic today for evaluation of right shoulder pain, states that his pain primarily started about April last year when he was increasing his workout routine he started to do more lifting activities.  He was doing overhead  press when he noted some moderate and acute onset of pain at that point in time.  Since then the pain is waxed and waned but really over the last 4 to 6 months his pain has been moderately worse, rates as a 6 out of 10, throbbing stabbing in nature with associated aching type pain.  Failure of improvement with home exercise program which is done for greater than 12 weeks as well as previous cortisone injection both in  and 2023.  Minimal improvement with meloxicam which she is taking on an intermittent basis.  He is made significant activity modifications and still is noting fairly significant levels of pain localized to the anterolateral aspect of the right shoulder with associated night pain that does occasionally wake him up from sleep.  He denies radiation of pain.  He is right-hand dominant.     Social History     Occupational History    Occupation: IT   Tobacco Use    Smoking status: Former     Current packs/day: 0.00     Average packs/day: 0.5 packs/day for 15.0 years (7.5 ttl pk-yrs)     Types: Cigarettes     Start date:      Quit date: 2000     Years since quittin.3     Passive exposure: Past    Smokeless tobacco: Never   Vaping Use    Vaping status: Never Used   Substance and Sexual Activity    Alcohol use: Yes     Alcohol/week: 7.0 standard drinks of alcohol     Types: 7 Glasses of wine per week     Comment: 1 g/o red wine daily w/dinner    Drug use: No    Sexual activity: Yes     Partners: Female      Past Medical History:   Diagnosis Date    Chest pain     Hypertension   "   Lightheadedness     MVP (mitral valve prolapse)     Palpitations     Sinus tachycardia     Visual disturbances      Past Surgical History:   Procedure Laterality Date    ADENOIDECTOMY      APPENDECTOMY N/A 9/10/2022    Procedure: APPENDECTOMY LAPAROSCOPIC;  Surgeon: Lakshmi Doty DO;  Location:  [facility] OR;  Service: General;  Laterality: N/A;  LAPAROSCOPIC APPENDECTOMY    CARPAL TUNNEL RELEASE      COLONOSCOPY N/A 10/22/2018    Procedure: COLONOSCOPY TO CECUM;  Surgeon: Flaco Fountain MD;  Location:  [facility]OSCOPY;  Service: Gastroenterology    HEMORROIDECTOMY      TONSILLECTOMY         Family History   Problem Relation Age of Onset    Stroke Father     Stroke Paternal Uncle     Hypertension Paternal Uncle     Hypertension Paternal Uncle     Suicide Attempts Mother         suicide    No Known Problems Maternal Grandfather     No Known Problems Paternal Grandmother     No Known Problems Paternal Grandfather     No Known Problems Other          Review of Systems        Objective:  Vitals:    04/17/24 1116   BP: 131/78   Pulse: 66   Weight: 104 kg (230 lb)   Height: 188 cm (74\")         04/17/24  1116   Weight: 104 kg (230 lb)     Body mass index is 29.53 kg/m².  Physical Exam    Vital signs reviewed.   General: No acute distress, alert and oriented  Eyes: conjunctiva clear; pupils equally round and reactive  ENT: external ears and nose atraumatic; oropharynx clear  CV: no peripheral edema  Resp: normal respiratory effort  Skin: no rashes or wounds; normal turgor  Psych: mood and affect appropriate; recent and remote memory intact        Ortho Exam     Right shoulder-maximal tenderness palpation anterior laterally over McBurney's point as well as anteriorly of the biceps tendon.  Active forward flexion 175 degrees with 4- out of 5 strength, active external rotation 35 degrees, 4+ out of 5 strength.  Positive decant test, positive Goodwin and Neer's, positive Yergason and speeds, equivocal Victoria's.  " Brisk cap refill all digits, 2+ radial pulse right wrist.  Positive sensation light touch all distributions right hand symmetric to the left.  Negative apprehension relocation test.  No tenderness over AC joint with negative crossarm test.    Imaging:         File Link          Review of outside MRI right shoulder including review of imaging as well as radiology report indicates partial-thickness rotator cuff tear of the supraspinatus, subacromial bursitis, no evidence of full-thickness rotator cuff tear.  Mild degenerative change of the AC joint.    Assessment:        1. Subacromial bursitis of right shoulder joint    2. Incomplete tear of right rotator cuff, unspecified whether traumatic    3. Rotator cuff tendinitis, right           Plan:          Discussed treatment options at length with patient at today's visit.  Reviewed options including but not limited to repeat injection, continue with conservative treatment which patient is already tried, or surgical treatment with arthroscopy, rotator cuff repair for high-grade partial-thickness tear and biceps tenodesis, subacromial compression, and all associated procedures.  Given significant failure of conservative treatment which patient is exhausted as noted above as well as this persistent pain which has been present for greater than a year, we discussed options and he would like to proceed with surgical treatment at this time.  Plan will be for right shoulder arthroscopy, rotator cuff debridement versus repair with possible grafting, possible biceps tenodesis, subacromial decompression, and all associated procedures.  I reviewed risks benefits and alternatives the procedure with risks including not limited to neurovascular damage, bleeding, infection, chronic pain, re-tear rotator cuff, failure of healing rotator cuff, loss of motion, weakness, stiffness, instability, biceps sag, DVT, pulmonary embolus, death, stroke, complex regional pain syndrome, myocardial  infarction, need for additional procedures. He understood all these had all questions answered.  Patient verbally consented to proceed with surgery.  No guarantees were given regarding results of surgery.  We will have patient medically optimized by primary care physician and proceed with surgery at next available date.   I discussed with the patient the potential use of [employer] bio inductive implant for treatment of their partial-thickness rotator cuff tear.  The implant can be used to treat various stages of rotator cuff disease, including tendinosis, partial-thickness tears, and full-thickness tears, either as a standalone therapy or in conjunction with standard repair techniques. In partial tear patients, it can be used in isolation to address partial thickness pathology and reverse disease progression in patients. It has comparable healing rates to traditional repair methodologies but holds  a documented reduction in sling time, lower pain scores, less PT visits, lower opioid use, quicker return to work, driving, and sport. There is also a significant reduction in OR time when utilized on partial thickness tears compared to traditional repair methods.  The accelerated rehabilitation timeline for partials and reduction in OR time significantly benefits patients and improved clinical outcomes.  Patient denies history of DVT or pulmonary embolus, denies cardiac history, he is not diabetic  Follow up: For surgical treatment      Adan Chauhan was in agreement with plan and had all questions answered.     Orders:  No orders of the defined types were placed in this encounter.      Medications:  No orders of the defined types were placed in this encounter.      Followup:  No follow-ups on file.    Diagnoses and all orders for this visit:    1. Subacromial bursitis of right shoulder joint (Primary)    2. Incomplete tear of right rotator cuff, unspecified whether traumatic    3. Rotator cuff tendinitis,  right          Dictated utilizing Dragon dictation

## 2024-04-18 RX ORDER — ACETAMINOPHEN 325 MG/1
1000 TABLET ORAL ONCE
OUTPATIENT
Start: 2024-04-18 | End: 2024-04-18

## 2024-04-18 RX ORDER — MELOXICAM 7.5 MG/1
15 TABLET ORAL ONCE
OUTPATIENT
Start: 2024-04-18 | End: 2024-04-18

## 2024-04-18 RX ORDER — PREGABALIN 150 MG/1
150 CAPSULE ORAL ONCE
OUTPATIENT
Start: 2024-04-18 | End: 2024-04-18

## 2024-05-30 ENCOUNTER — OFFICE VISIT (OUTPATIENT)
Dept: FAMILY MEDICINE CLINIC | Facility: CLINIC | Age: 54
End: 2024-05-30
Payer: COMMERCIAL

## 2024-05-30 VITALS
HEIGHT: 74 IN | DIASTOLIC BLOOD PRESSURE: 68 MMHG | OXYGEN SATURATION: 98 % | TEMPERATURE: 97.8 F | HEART RATE: 64 BPM | BODY MASS INDEX: 29.9 KG/M2 | SYSTOLIC BLOOD PRESSURE: 120 MMHG | RESPIRATION RATE: 14 BRPM | WEIGHT: 233 LBS

## 2024-05-30 DIAGNOSIS — E78.00 HYPERCHOLESTEREMIA: ICD-10-CM

## 2024-05-30 DIAGNOSIS — I10 ESSENTIAL HYPERTENSION: Primary | ICD-10-CM

## 2024-05-30 PROCEDURE — 99214 OFFICE O/P EST MOD 30 MIN: CPT | Performed by: PHYSICIAN ASSISTANT

## 2024-05-30 RX ORDER — AMLODIPINE BESYLATE 10 MG/1
10 TABLET ORAL DAILY
Qty: 90 TABLET | Refills: 0 | Status: SHIPPED | OUTPATIENT
Start: 2024-05-30

## 2024-05-30 NOTE — PROGRESS NOTES
"Chief Complaint  Hypertension (management)    Subjective          Adan Chauhan presents to [facility]  History of Present Illness  Adan is a 53 year old male who presents hypertension and hypercholesterolemia [facility] management.  He has gained 9 pounds since last office visit in November 2023.  Adan states that he has not been able to exercise due to right shoulder pain.  He has been seeing Dr. Perez for this.  He is looking at having surgery performed for a supraspinatus tendon tear.  He has undergone steroid injections with minimal relief.  His diet has been healthy.  Sleep has been restless due to shoulder pain.  Bowel movements have been regular without dark black tarry stools.  He has been taking meloxicam to help with shoulder pain with some relief.  Has been compliant with medications.  Denied any current chest pain, shortness of air, cough, wheezing, shortness of air, wheezing, abdominal pain, GI upset or swelling of ankles.     Objective   Vital Signs:   /68 (BP Location: Left arm, Patient Position: Sitting, Cuff Size: Adult)   Pulse 64   Temp 97.8 °F (36.6 °C) (Infrared)   Resp 14   Ht 188 cm (74\")   Wt 106 kg (233 lb)   SpO2 98%   BMI 29.92 kg/m²       ,BMI is >= 25 and <30. (Overweight) The following options were offered after discussion;: weight loss educational material (shared in after visit summary), exercise counseling/recommendations, and nutrition counseling/recommendations   Physical Exam  Vitals and nursing note reviewed.   Constitutional:       Appearance: Normal appearance. He is well-developed, well-groomed and overweight.   HENT:      Head: Normocephalic and atraumatic.   Neck:      Vascular: No carotid bruit.      Trachea: Trachea and phonation normal. No tracheal tenderness.   Cardiovascular:      Rate and Rhythm: Normal rate and regular rhythm.      Pulses: Normal pulses.      Heart sounds: Normal heart sounds, S1 normal and S2 normal. No " murmur heard.  Pulmonary:      Effort: Pulmonary effort is normal.      Breath sounds: Normal breath sounds and air entry.   Abdominal:      General: Bowel sounds are normal.      Palpations: Abdomen is soft. There is no hepatomegaly.      Tenderness: There is no abdominal tenderness. There is no right CVA tenderness, left CVA tenderness, guarding or rebound. Negative signs include Reese's sign, Rovsing's sign, McBurney's sign, psoas sign and obturator sign.   Musculoskeletal:      Cervical back: Neck supple.      Right lower leg: No edema.      Left lower leg: No edema.   Skin:     General: Skin is warm and dry.      Capillary Refill: Capillary refill takes less than 2 seconds.   Neurological:      Mental Status: He is alert and oriented to person, place, and time.   Psychiatric:         Attention and Perception: Attention and perception normal.         Mood and Affect: Mood and affect normal.         Speech: Speech normal.         Behavior: Behavior normal. Behavior is cooperative.         Thought Content: Thought content normal.         Cognition and Memory: Cognition and memory normal.         Judgment: Judgment normal.        Result Review :     CMP          11/10/2023    09:53 11/17/2023    08:22 5/24/2024    08:41   CMP   Glucose 96   88    BUN 16   12    Creatinine 1.09   1.03    Sodium 141   143    Potassium 4.3   4.4    Chloride 102   103    Calcium 10.9  10.2  10.3    Total Protein 6.7   6.3    Albumin 4.7   4.4    Globulin 2.0   1.9    Total Bilirubin 0.6   0.5    Alkaline Phosphatase 72   68    AST (SGOT) 18   18    ALT (SGPT) 23   20    BUN/Creatinine Ratio 14.7   11.7      CBC          11/10/2023    09:53 5/24/2024    08:41   CBC   WBC 8.26  7.44    RBC 5.10  5.14    Hemoglobin 15.8  15.0    Hematocrit 47.1  45.7    MCV 92.4  88.9    MCH 31.0  29.2    MCHC 33.5  32.8    RDW 12.4  12.2    Platelets 312  303      Lipid Panel          11/10/2023    09:53 5/24/2024    08:41   Lipid Panel   Total  Cholesterol 223  218    Triglycerides 116  106    HDL Cholesterol 63  44    VLDL Cholesterol 20  19    LDL Cholesterol  140  155    LDL/HDL Ratio 2.17  3.47      TSH          11/17/2023    08:22   TSH   TSH 2.430                    Assessment and Plan    Diagnoses and all orders for this visit:    1. Essential hypertension (Primary)  -     amLODIPine (NORVASC) 10 MG tablet; Take 1 tablet by mouth Daily.  Dispense: 90 tablet; Refill: 0    2. Hypercholesteremia    1.  Chronic and stable hypertension: Blood pressure was in therapeutic range at 120/68.  Have refilled his amlodipine medication to pharmacy.  Schedule follow-up appointment in office in 6 months.  2.  Chronic and slight improving hyperlipidemia: Have reviewed above blood work with him today.  Total cholesterol has decreased but LDL has increased slightly.  He will continue his fish oil at home.  I am hoping he can start exercising more once his shoulder pain has resolved.  I suspect his elevated readings could be related to decreased activity.  Plan to recheck lab work in 6 months.  He would like to hold statin medication for now.    I spent 20 minutes caring for Adan on this date of service. This time includes time spent by me in the following activities:preparing for the visit, reviewing tests, obtaining and/or reviewing a separately obtained history, performing a medically appropriate examination and/or evaluation , counseling and educating the patient/family/caregiver, ordering medications, tests, or procedures, and documenting information in the medical record  Follow Up   Return in about 6 months (around 11/30/2024), or labs prior HTn chol, for Annual.  Patient was given instructions and counseling regarding his condition or for health maintenance advice. Please see specific information pulled into the AVS if appropriate.     [facility]  [address]  [place] [address]  Dept: [phone]  Dept Fax: [phone]  Loc: [phone]  Loc Fax: [phone]

## 2024-06-05 ENCOUNTER — TELEPHONE (OUTPATIENT)
Dept: ORTHOPEDIC SURGERY | Facility: CLINIC | Age: 54
End: 2024-06-05
Payer: COMMERCIAL

## 2024-06-05 NOTE — TELEPHONE ENCOUNTER
LVM TO LET PATIENT KNOW THAT I JUST GOT AND EMAIL LATE TODAY AND THAT SOMEONE SHOULD BE GETTING BACK TO ME.  I WILL LET HIM KNOW AS SOON AS I DO.

## 2024-07-08 ENCOUNTER — TELEPHONE (OUTPATIENT)
Dept: ORTHOPEDIC SURGERY | Facility: CLINIC | Age: 54
End: 2024-07-08
Payer: COMMERCIAL

## 2024-08-16 ENCOUNTER — TELEPHONE (OUTPATIENT)
Dept: ORTHOPEDIC SURGERY | Facility: CLINIC | Age: 54
End: 2024-08-16
Payer: COMMERCIAL

## 2024-08-16 NOTE — TELEPHONE ENCOUNTER
Patient called back wanting a referral to a specialist for his R shoulder surgery. (Said [facility]el was denied and you were going to refer him out for this) Said he has talked to you about it and has been waiting awhile. Can you enter the referral please.

## 2024-08-19 ENCOUNTER — TELEPHONE (OUTPATIENT)
Dept: ORTHOPEDIC SURGERY | Facility: CLINIC | Age: 54
End: 2024-08-19
Payer: COMMERCIAL

## 2024-08-19 NOTE — TELEPHONE ENCOUNTER
Patient called regarding [employer] denial. States he is asking for the referral you had discussed with him. Requesting a call back.  [phone]

## 2024-11-22 ENCOUNTER — OFFICE VISIT (OUTPATIENT)
Dept: FAMILY MEDICINE CLINIC | Facility: CLINIC | Age: 54
End: 2024-11-22
Payer: COMMERCIAL

## 2024-11-22 VITALS
BODY MASS INDEX: 29.52 KG/M2 | DIASTOLIC BLOOD PRESSURE: 70 MMHG | TEMPERATURE: 97.3 F | SYSTOLIC BLOOD PRESSURE: 118 MMHG | HEIGHT: 74 IN | HEART RATE: 53 BPM | WEIGHT: 230 LBS | OXYGEN SATURATION: 100 %

## 2024-11-22 DIAGNOSIS — I10 ESSENTIAL HYPERTENSION: ICD-10-CM

## 2024-11-22 DIAGNOSIS — E78.00 HYPERCHOLESTEREMIA: ICD-10-CM

## 2024-11-22 DIAGNOSIS — I10 ESSENTIAL HYPERTENSION: Primary | ICD-10-CM

## 2024-11-22 DIAGNOSIS — E55.9 VITAMIN D INSUFFICIENCY: ICD-10-CM

## 2024-11-22 DIAGNOSIS — Z12.5 SCREENING PSA (PROSTATE SPECIFIC ANTIGEN): ICD-10-CM

## 2024-11-22 DIAGNOSIS — M25.511 ACUTE PAIN OF RIGHT SHOULDER: ICD-10-CM

## 2024-11-22 DIAGNOSIS — Z23 FLU VACCINE NEED: ICD-10-CM

## 2024-11-22 RX ORDER — MELOXICAM 15 MG/1
15 TABLET ORAL DAILY
Qty: 90 TABLET | Refills: 0 | Status: SHIPPED | OUTPATIENT
Start: 2024-11-22

## 2024-11-22 RX ORDER — AMLODIPINE BESYLATE 10 MG/1
10 TABLET ORAL DAILY
Qty: 90 TABLET | Refills: 3 | Status: SHIPPED | OUTPATIENT
Start: 2024-11-22

## 2024-11-22 NOTE — PROGRESS NOTES
"  Subjective   Adan Chauhan is a 54 y.o. male who is here for   Chief Complaint   Patient presents with    [facility]   .     History of Present Illness   History of Present Illness  Adan Chauhan presents to the office to [facility].  Patient is here for management of hypertension, hyperlipidemia.     Patient states he recently underwent rotator cuff repair on his right.  Patient states postoperatively he has been doing well.  Patient has a longstanding history of hypertension that has been well-controlled and controlled with Norvasc 10 mg daily.  Patient denies any chest pain or shortness of breath.      Review of Systems   Constitutional:  Negative for activity change and appetite change.   Respiratory:  Negative for cough and shortness of breath.    Cardiovascular:  Negative for chest pain and leg swelling.   Skin:  Negative for color change and rash.       Objective   Vitals:    11/22/24 1035   BP: 118/70   BP Location: Left arm   Patient Position: Sitting   Cuff Size: Large Adult   Pulse: 53   Temp: 97.3 °F (36.3 °C)   SpO2: 100%   Weight: 104 kg (230 lb)   Height: 188 cm (74.02\")      Physical Exam  Vitals and nursing note reviewed.   Constitutional:       Appearance: Normal appearance. He is normal weight.   HENT:      Head: Normocephalic and atraumatic.   Cardiovascular:      Rate and Rhythm: Normal rate and regular rhythm.      Pulses: Normal pulses.      Heart sounds: No murmur heard.  Pulmonary:      Effort: Pulmonary effort is normal. No respiratory distress.      Breath sounds: Normal breath sounds. No wheezing.   Skin:     General: Skin is warm and dry.   Neurological:      General: No focal deficit present.      Mental Status: He is alert.   Psychiatric:         Mood and Affect: Mood normal.         Thought Content: Thought content normal.       Physical Exam        Assessment & Plan   Assessment & Plan    Diagnoses and all orders for this visit:    1. Essential hypertension " (Primary)  Continue amlodipine 10 mg daily.  -     TSH Rfx On Abnormal To Free T4; Future  -     CBC & Differential; Future  -     Lipid Panel; Future  -     Comprehensive Metabolic Panel; Future  -     amLODIPine (NORVASC) 10 MG tablet; Take 1 tablet by mouth Daily.  Dispense: 90 tablet; Refill: 3    2. Hypercholesteremia  We will check CMP and lipid panel.  Will repeat labs in 6 months as well.  If remains elevated despite getting back into physical activity will need to start cholesterol medications.  3. Screening PSA (prostate specific antigen)  Repeat PSA.  -     PSA SCREENING; Future    5. Acute pain of right shoulder  S/p right rotator cuff repair. \Continue mobic 15 mg po daily.   -     meloxicam (MOBIC) 15 MG tablet; Take 1 tablet by mouth Daily.  Dispense: 90 tablet; Refill: 0    6. Vitamin D insufficiency  Continue vitamin D supplementation.   -     vitamin D3 125 MCG (5000 UT) capsule capsule; Take 1 capsule by mouth Daily.  Dispense: 90 capsule; Refill: 3    7. Need Flu vaccine  -     Fluzone >6mos ([id number])      Results      There are no Patient Instructions on file for this visit.    Medications Discontinued During This Encounter   Medication Reason    meloxicam (MOBIC) 15 MG tablet Reorder    amLODIPine (NORVASC) 10 MG tablet Reorder    vitamin D3 125 MCG (5000 UT) capsule capsule Reorder    vitamin D3 125 MCG (5000 UT) capsule capsule         Return in about 6 months (around 5/22/2025), or hlD.           Alli Powell MD  [place], [place]

## 2024-11-25 ENCOUNTER — LAB (OUTPATIENT)
Dept: LAB | Facility: HOSPITAL | Age: 54
End: 2024-11-25
Payer: COMMERCIAL

## 2024-11-25 LAB
ALBUMIN SERPL-MCNC: 4.1 G/DL (ref 3.5–5.2)
ALBUMIN/GLOB SERPL: 1.9 G/DL
ALP SERPL-CCNC: 71 U/L (ref 39–117)
ALT SERPL W P-5'-P-CCNC: 25 U/L (ref 1–41)
ANION GAP SERPL CALCULATED.3IONS-SCNC: 8 MMOL/L (ref 5–15)
AST SERPL-CCNC: 19 U/L (ref 1–40)
BASOPHILS # BLD AUTO: 0.03 10*3/MM3 (ref 0–0.2)
BASOPHILS NFR BLD AUTO: 0.4 % (ref 0–1.5)
BILIRUB SERPL-MCNC: 0.8 MG/DL (ref 0–1.2)
BUN SERPL-MCNC: 17 MG/DL (ref 6–20)
BUN/CREAT SERPL: 14.9 (ref 7–25)
CALCIUM SPEC-SCNC: 10.1 MG/DL (ref 8.6–10.5)
CHLORIDE SERPL-SCNC: 106 MMOL/L (ref 98–107)
CHOLEST SERPL-MCNC: 184 MG/DL (ref 0–200)
CO2 SERPL-SCNC: 26 MMOL/L (ref 22–29)
CREAT SERPL-MCNC: 1.14 MG/DL (ref 0.76–1.27)
DEPRECATED RDW RBC AUTO: 41.5 FL (ref 37–54)
EGFRCR SERPLBLD CKD-EPI 2021: 76.9 ML/MIN/1.73
EOSINOPHIL # BLD AUTO: 0.18 10*3/MM3 (ref 0–0.4)
EOSINOPHIL NFR BLD AUTO: 2.3 % (ref 0.3–6.2)
ERYTHROCYTE [DISTWIDTH] IN BLOOD BY AUTOMATED COUNT: 13 % (ref 12.3–15.4)
GLOBULIN UR ELPH-MCNC: 2.2 GM/DL
GLUCOSE SERPL-MCNC: 88 MG/DL (ref 65–99)
HCT VFR BLD AUTO: 44.6 % (ref 37.5–51)
HDLC SERPL-MCNC: 47 MG/DL (ref 40–60)
HGB BLD-MCNC: 14.8 G/DL (ref 13–17.7)
IMM GRANULOCYTES # BLD AUTO: 0.01 10*3/MM3 (ref 0–0.05)
IMM GRANULOCYTES NFR BLD AUTO: 0.1 % (ref 0–0.5)
LDLC SERPL CALC-MCNC: 120 MG/DL (ref 0–100)
LDLC/HDLC SERPL: 2.53 {RATIO}
LYMPHOCYTES # BLD AUTO: 2.32 10*3/MM3 (ref 0.7–3.1)
LYMPHOCYTES NFR BLD AUTO: 29.6 % (ref 19.6–45.3)
MCH RBC QN AUTO: 29.1 PG (ref 26.6–33)
MCHC RBC AUTO-ENTMCNC: 33.2 G/DL (ref 31.5–35.7)
MCV RBC AUTO: 87.8 FL (ref 79–97)
MONOCYTES # BLD AUTO: 0.77 10*3/MM3 (ref 0.1–0.9)
MONOCYTES NFR BLD AUTO: 9.8 % (ref 5–12)
NEUTROPHILS NFR BLD AUTO: 4.52 10*3/MM3 (ref 1.7–7)
NEUTROPHILS NFR BLD AUTO: 57.8 % (ref 42.7–76)
NRBC BLD AUTO-RTO: 0 /100 WBC (ref 0–0.2)
PLATELET # BLD AUTO: 292 10*3/MM3 (ref 140–450)
PMV BLD AUTO: 10.8 FL (ref 6–12)
POTASSIUM SERPL-SCNC: 4.1 MMOL/L (ref 3.5–5.2)
PROT SERPL-MCNC: 6.3 G/DL (ref 6–8.5)
PSA SERPL-MCNC: 4.52 NG/ML (ref 0–4)
RBC # BLD AUTO: 5.08 10*6/MM3 (ref 4.14–5.8)
SODIUM SERPL-SCNC: 140 MMOL/L (ref 136–145)
TRIGL SERPL-MCNC: 90 MG/DL (ref 0–150)
TSH SERPL DL<=0.05 MIU/L-ACNC: 2.23 UIU/ML (ref 0.27–4.2)
VLDLC SERPL-MCNC: 17 MG/DL (ref 5–40)
WBC NRBC COR # BLD AUTO: 7.83 10*3/MM3 (ref 3.4–10.8)

## 2024-11-25 PROCEDURE — G0103 PSA SCREENING: HCPCS | Performed by: FAMILY MEDICINE

## 2024-11-25 PROCEDURE — 80053 COMPREHEN METABOLIC PANEL: CPT | Performed by: FAMILY MEDICINE

## 2024-11-25 PROCEDURE — 85025 COMPLETE CBC W/AUTO DIFF WBC: CPT | Performed by: FAMILY MEDICINE

## 2024-11-25 PROCEDURE — 84443 ASSAY THYROID STIM HORMONE: CPT | Performed by: FAMILY MEDICINE

## 2024-11-25 PROCEDURE — 80061 LIPID PANEL: CPT | Performed by: FAMILY MEDICINE

## 2024-11-27 DIAGNOSIS — R97.20 ELEVATED PSA: Primary | ICD-10-CM

## 2025-03-17 ENCOUNTER — TRANSCRIBE ORDERS (OUTPATIENT)
Dept: ADMINISTRATIVE | Facility: HOSPITAL | Age: 55
End: 2025-03-17
Payer: COMMERCIAL

## 2025-03-17 ENCOUNTER — LAB (OUTPATIENT)
Dept: LAB | Facility: HOSPITAL | Age: 55
End: 2025-03-17
Payer: COMMERCIAL

## 2025-03-17 DIAGNOSIS — R97.20 ELEVATED PROSTATE SPECIFIC ANTIGEN (PSA): ICD-10-CM

## 2025-03-17 DIAGNOSIS — R97.20 ELEVATED PROSTATE SPECIFIC ANTIGEN (PSA): Primary | ICD-10-CM

## 2025-03-17 LAB — PSA SERPL-MCNC: 3.92 NG/ML (ref 0–4)

## 2025-03-17 PROCEDURE — 36415 COLL VENOUS BLD VENIPUNCTURE: CPT

## 2025-03-17 PROCEDURE — 84153 ASSAY OF PSA TOTAL: CPT

## 2025-04-28 DIAGNOSIS — I10 ESSENTIAL HYPERTENSION: Primary | ICD-10-CM

## 2025-04-28 DIAGNOSIS — E55.9 VITAMIN D INSUFFICIENCY: ICD-10-CM

## 2025-04-28 DIAGNOSIS — E78.00 HYPERCHOLESTEREMIA: ICD-10-CM

## 2025-05-17 ENCOUNTER — LAB (OUTPATIENT)
Dept: LAB | Facility: HOSPITAL | Age: 55
End: 2025-05-17
Payer: COMMERCIAL

## 2025-05-17 PROCEDURE — 80053 COMPREHEN METABOLIC PANEL: CPT | Performed by: FAMILY MEDICINE

## 2025-05-17 PROCEDURE — 82306 VITAMIN D 25 HYDROXY: CPT | Performed by: FAMILY MEDICINE

## 2025-05-17 PROCEDURE — 81001 URINALYSIS AUTO W/SCOPE: CPT | Performed by: FAMILY MEDICINE

## 2025-05-17 PROCEDURE — 80061 LIPID PANEL: CPT | Performed by: FAMILY MEDICINE

## 2025-05-17 PROCEDURE — 85025 COMPLETE CBC W/AUTO DIFF WBC: CPT | Performed by: FAMILY MEDICINE

## 2025-05-22 ENCOUNTER — OFFICE VISIT (OUTPATIENT)
Dept: FAMILY MEDICINE CLINIC | Facility: CLINIC | Age: 55
End: 2025-05-22
Payer: COMMERCIAL

## 2025-05-22 VITALS
HEIGHT: 74 IN | TEMPERATURE: 97.7 F | DIASTOLIC BLOOD PRESSURE: 80 MMHG | SYSTOLIC BLOOD PRESSURE: 120 MMHG | OXYGEN SATURATION: 98 % | HEART RATE: 65 BPM | BODY MASS INDEX: 29.13 KG/M2 | WEIGHT: 227 LBS

## 2025-05-22 DIAGNOSIS — E55.9 VITAMIN D INSUFFICIENCY: ICD-10-CM

## 2025-05-22 DIAGNOSIS — E78.00 HYPERCHOLESTEREMIA: ICD-10-CM

## 2025-05-22 DIAGNOSIS — I10 ESSENTIAL HYPERTENSION: ICD-10-CM

## 2025-05-22 DIAGNOSIS — Z00.00 ANNUAL PHYSICAL EXAM: Primary | ICD-10-CM

## 2025-05-22 DIAGNOSIS — G47.33 OSA ON CPAP: ICD-10-CM

## 2025-05-22 PROCEDURE — 99396 PREV VISIT EST AGE 40-64: CPT | Performed by: FAMILY MEDICINE

## 2025-05-22 RX ORDER — AMLODIPINE BESYLATE 10 MG/1
10 TABLET ORAL DAILY
Qty: 90 TABLET | Refills: 3 | Status: SHIPPED | OUTPATIENT
Start: 2025-05-22

## 2025-05-22 NOTE — PROGRESS NOTES
"  Subjective   Adan Chauhan is a 54 y.o. male who is here for   Chief Complaint   Patient presents with    Hypertension   .   Adan Chauhan presents to the office for annual physical and follow-up on hypertension.    Hypertension  Chronicity:  Chronic  Condition status:  Controlled  Associated symptoms: no anxiety, no chest pain, no shortness of breath and no dyspnea with exertion    CAD risks:  No known risk factors  Past treatments:  Calcium channel blockers     History of Present Illness      Review of Systems   Constitutional:  Negative for activity change and appetite change.   Respiratory:  Negative for cough and shortness of breath.    Cardiovascular:  Negative for chest pain and leg swelling.   Skin:  Negative for color change and rash.       Objective   Vitals:    05/22/25 0805   BP: 120/80   BP Location: Left arm   Patient Position: Sitting   Cuff Size: Large Adult   Pulse: 65   Temp: 97.7 °F (36.5 °C)   SpO2: 98%   Weight: 103 kg (227 lb)   Height: 188 cm (74.02\")      Physical Exam  Vitals and nursing note reviewed.   Constitutional:       Appearance: Normal appearance. He is normal weight.   HENT:      Head: Normocephalic and atraumatic.   Cardiovascular:      Rate and Rhythm: Normal rate and regular rhythm.      Pulses: Normal pulses.      Heart sounds: No murmur heard.  Pulmonary:      Effort: Pulmonary effort is normal. No respiratory distress.      Breath sounds: Normal breath sounds. No wheezing.   Skin:     General: Skin is warm and dry.   Neurological:      General: No focal deficit present.      Mental Status: He is alert.   Psychiatric:         Mood and Affect: Mood normal.         Thought Content: Thought content normal.       Physical Exam        Assessment & Plan   Assessment & Plan    Diagnoses and all orders for this visit:    1. Annual physical exam (Primary)  Reviewed health maintenance.  Reviewed recent labs.  2. Essential hypertension  Continue amlodipine 10 mg daily.  -     " "amLODIPine (NORVASC) 10 MG tablet; Take 1 tablet by mouth Daily.  Dispense: 90 tablet; Refill: 3    3. Hypercholesteremia  Discussed dietary changes to help improve cholesterol.  Discussed increased exercise to also help improve cholesterol.  Will repeat in 6 months if LDL is still greater than 120 discussed possibly adding statin medication.  4. MEGAN on CPAP  Continue use of CPAP.  5. Vitamin D insufficiency  Continue vitamin D supplementation.  -     vitamin D3 125 MCG (5000 UT) capsule capsule; Take 1 capsule by mouth Daily.  Dispense: 90 capsule; Refill: 3      Results  Lab Results   Component Value Date    GLUCOSE 77 05/17/2025    BUN 15 05/17/2025    CREATININE 1.11 05/17/2025     05/17/2025    K 4.0 05/17/2025     05/17/2025    CALCIUM 10.4 05/17/2025    PROTEINTOT 6.6 05/17/2025    ALBUMIN 4.2 05/17/2025    ALT 24 05/17/2025    AST 24 05/17/2025    ALKPHOS 72 05/17/2025    BILITOT 0.6 05/17/2025    GLOB 2.4 05/17/2025    AGRATIO 1.8 05/17/2025    BCR 13.5 05/17/2025    ANIONGAP 10.9 05/17/2025    EGFR 79.4 05/17/2025     Lab Results   Component Value Date    WBC 6.92 05/17/2025    HGB 14.8 05/17/2025    HCT 47.3 05/17/2025    MCV 95.7 05/17/2025     05/17/2025     Lab Results   Component Value Date    CHOL 186 05/17/2025    CHLPL 218 (H) 05/24/2024    TRIG 99 05/17/2025    HDL 47 05/17/2025     (H) 05/17/2025     Lab Results   Component Value Date    HGBA1C 5.4 10/19/2020     Lab Results   Component Value Date    CHOL 186 05/17/2025    CHLPL 218 (H) 05/24/2024    TRIG 99 05/17/2025    HDL 47 05/17/2025     (H) 05/17/2025     No results found for: \"MICROALBUR\", \"[id number]\"        There are no Patient Instructions on file for this visit.    Medications Discontinued During This Encounter   Medication Reason    meloxicam (MOBIC) 15 MG tablet *Therapy completed    amLODIPine (NORVASC) 10 MG tablet Reorder    vitamin D3 125 MCG (5000 UT) capsule capsule Reorder        Return in " about 6 months (around 11/22/2025), or HTN, for Recheck.       Alli Powell MD  [facility]  [place], [place]

## 2025-06-23 ENCOUNTER — TRANSCRIBE ORDERS (OUTPATIENT)
Dept: ADMINISTRATIVE | Facility: HOSPITAL | Age: 55
End: 2025-06-23
Payer: COMMERCIAL

## 2025-06-23 ENCOUNTER — LAB (OUTPATIENT)
Dept: LAB | Facility: HOSPITAL | Age: 55
End: 2025-06-23
Payer: COMMERCIAL

## 2025-06-23 DIAGNOSIS — R97.20 ELEVATED PROSTATE SPECIFIC ANTIGEN (PSA): ICD-10-CM

## 2025-06-23 DIAGNOSIS — R97.20 ELEVATED PROSTATE SPECIFIC ANTIGEN (PSA): Primary | ICD-10-CM

## 2025-06-23 LAB — PSA SERPL-MCNC: 4.55 NG/ML (ref 0–4)

## 2025-06-23 PROCEDURE — 36415 COLL VENOUS BLD VENIPUNCTURE: CPT

## 2025-06-23 PROCEDURE — 84153 ASSAY OF PSA TOTAL: CPT

## (undated) DEVICE — DRSNG TELFA PAD NONADH STR 1S 3X8IN

## (undated) DEVICE — TBG INSUFL W FLTR STRL

## (undated) DEVICE — TOTAL TRAY, DB, 100% SILI FOLEY, 16FR 10: Brand: MEDLINE

## (undated) DEVICE — LAPAROSCOPIC SCOPE WARMER: Brand: DEROYAL

## (undated) DEVICE — ENDOPATH XCEL BLADELESS TROCARS WITH STABILITY SLEEVES: Brand: ENDOPATH XCEL

## (undated) DEVICE — THE TORRENT IRRIGATION SCOPE CONNECTOR IS USED WITH THE TORRENT IRRIGATION TUBING TO PROVIDE IRRIGATION FLUIDS SUCH AS STERILE WATER DURING GASTROINTESTINAL ENDOSCOPIC PROCEDURES WHEN USED IN CONJUNCTION WITH AN IRRIGATION PUMP (OR ELECTROSURGICAL UNIT).: Brand: TORRENT

## (undated) DEVICE — TUBING, SUCTION, 1/4" X 10', STRAIGHT: Brand: MEDLINE

## (undated) DEVICE — SPNG GZ 2S 2X2 8PLY STRL PK/2

## (undated) DEVICE — LAPAROSCOPIC SMOKE FILTRATION SYSTEM: Brand: PALL LAPAROSHIELD® PLUS LAPAROSCOPIC SMOKE FILTRATION SYSTEM

## (undated) DEVICE — 2, DISPOSABLE SUCTION/IRRIGATOR WITH DISPOSABLE TIP: Brand: STRYKEFLOW

## (undated) DEVICE — ADHS SKIN PREMIERPRO EXOFIN TOPICAL HI/VISC .5ML

## (undated) DEVICE — HARMONIC ACE +7 LAPAROSCOPIC SHEARS ADVANCED HEMOSTASIS 5MM DIAMETER 36CM SHAFT LENGTH  FOR USE WITH GRAY HAND PIECE ONLY: Brand: HARMONIC ACE

## (undated) DEVICE — ENDOPATH XCEL UNIVERSAL TROCAR STABLILITY SLEEVES: Brand: ENDOPATH XCEL

## (undated) DEVICE — APPL CHLORAPREP HI/LITE 26ML ORNG

## (undated) DEVICE — Device: Brand: DEFENDO AIR/WATER/SUCTION AND BIOPSY VALVE

## (undated) DEVICE — SUT VIC 0/0 UR6 27IN DYED J603H

## (undated) DEVICE — UNDYED BRAIDED (POLYGLACTIN 910), SYNTHETIC ABSORBABLE SUTURE: Brand: COATED VICRYL

## (undated) DEVICE — ENDOPOUCH RETRIEVER SPECIMEN RETRIEVAL BAGS: Brand: ENDOPOUCH RETRIEVER

## (undated) DEVICE — TRAP FLD MINIVAC MEGADYNE 100ML

## (undated) DEVICE — ECHELON FLEX45 ENDOPATH STAPLER, ARTICULATING ENDOSCOPIC LINEAR CUTTER (NO CARTRIDGE): Brand: ECHELON ENDOPATH

## (undated) DEVICE — CANN NASL CO2 TRULINK W/O2 A/

## (undated) DEVICE — GLV SURG SENSICARE W/ALOE PF LF 6.5 STRL

## (undated) DEVICE — DRSNG SURESITE WNDW 2.38X2.75

## (undated) DEVICE — DRSNG SURESITE WNDW 4X4.5

## (undated) DEVICE — TROCARS: Brand: KII® BALLOON BLUNT TIP SYSTEM

## (undated) DEVICE — SAFELINER SUCTION CANISTER 1000CC: Brand: DEROYAL

## (undated) DEVICE — PK LAP GEN 90

## (undated) DEVICE — PATIENT RETURN ELECTRODE, SINGLE-USE, CONTACT QUALITY MONITORING, ADULT, WITH 9FT CORD, FOR PATIENTS WEIGING OVER 33LBS. (15KG): Brand: MEGADYNE